# Patient Record
Sex: MALE | Race: WHITE | URBAN - METROPOLITAN AREA
[De-identification: names, ages, dates, MRNs, and addresses within clinical notes are randomized per-mention and may not be internally consistent; named-entity substitution may affect disease eponyms.]

---

## 2024-04-12 ENCOUNTER — TELEPHONE (OUTPATIENT)
Dept: INTERNAL MEDICINE | Age: 64
End: 2024-04-12

## 2024-04-21 NOTE — PROGRESS NOTES
[facility] - [facility] Internal Medicine      SUBJECTIVE:  Kamran Dutta (:  1960) is a 63 y.o. male here for evaluation of the following chief complaint(s):  Establish Care, Memory (Having memory issues, seen neurologist Dr. Veloz in [place], gets lost driving, carries notebook, ladder fell on head (TBI), [id number]), and severe nasal drainage  Complains of excessive drainage especially at night.  After eating he feels that the drainage and foot  gets stuck. Lots of coughing in the morning. Th ecough can be so severe that he nearly faints.  Uses Afrin every night. These symptoms started becoming worse 2 years ago. Currently on social security.  + deviated septum as well.    Start fluticasone and Claritin.    Check CT scan of sinuses       Elevated BP - has a history of HTN and was on medication.  Last time he took meds was in .  No chest josefa/pressure.  No dizziness or lightheadedness. No palpiatations.  No LE edema.  No Orthopnea or PND. No BULL.    HCTz, 25 mg once daily.   Amlodipine, 5 mg daily.    Check CMP, CBC, TSH, lipid panel.     + occasional migraines.   + neck pain - had LP in the past. First and last vial had blood. 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    Will need to review these old records.     Cable  for [employer] - ladder malfunctioned and hit him on the head.()  For months, when he looked up, he experienced numbness in his R arm. Also had Lumbar spine injury.  Saw provider in [place]. Went to a workman's comp provider but symptoms resolved after 6-8 months.  Occasional back pain.  Toes feel numb.    ? Related to spine disease. Will discuss further at next visit.     Tobacco use - has tried to quit.    Interested in patches. Nicoderm patches prescribed.     Heart murmur as a teenager - was not able to play baseball. No murmur appreciated on exam today.     States memory is very poor.  Unable to pinpoint

## 2024-04-22 ENCOUNTER — OFFICE VISIT (OUTPATIENT)
Dept: INTERNAL MEDICINE | Age: 64
End: 2024-04-22
Payer: MEDICAID

## 2024-04-22 VITALS
SYSTOLIC BLOOD PRESSURE: 178 MMHG | HEIGHT: 69 IN | OXYGEN SATURATION: 93 % | TEMPERATURE: 97 F | RESPIRATION RATE: 18 BRPM | BODY MASS INDEX: 23.7 KG/M2 | HEART RATE: 90 BPM | DIASTOLIC BLOOD PRESSURE: 100 MMHG | WEIGHT: 160 LBS

## 2024-04-22 DIAGNOSIS — Z13.9 ENCOUNTER FOR SCREENING INVOLVING SOCIAL DETERMINANTS OF HEALTH (SDOH): ICD-10-CM

## 2024-04-22 DIAGNOSIS — Z11.59 NEED FOR HEPATITIS C SCREENING TEST: ICD-10-CM

## 2024-04-22 DIAGNOSIS — J34.2 DEVIATED SEPTUM: ICD-10-CM

## 2024-04-22 DIAGNOSIS — Z11.4 SCREENING FOR HIV (HUMAN IMMUNODEFICIENCY VIRUS): ICD-10-CM

## 2024-04-22 DIAGNOSIS — Z12.5 SCREENING FOR PROSTATE CANCER: ICD-10-CM

## 2024-04-22 DIAGNOSIS — R41.3 MEMORY LOSS: Primary | ICD-10-CM

## 2024-04-22 DIAGNOSIS — I10 PRIMARY HYPERTENSION: ICD-10-CM

## 2024-04-22 DIAGNOSIS — R05.3 CHRONIC COUGH: ICD-10-CM

## 2024-04-22 DIAGNOSIS — J34.89 SINUS DRAINAGE: ICD-10-CM

## 2024-04-22 DIAGNOSIS — R41.3 MEMORY LOSS: ICD-10-CM

## 2024-04-22 LAB
ALBUMIN: 4.5 G/DL (ref 3.5–5.2)
ALP BLD-CCNC: 95 U/L (ref 40–129)
ALT SERPL-CCNC: 16 U/L (ref 0–40)
ANION GAP SERPL CALCULATED.3IONS-SCNC: 16 MMOL/L (ref 7–16)
AST SERPL-CCNC: 19 U/L (ref 0–39)
BASOPHILS ABSOLUTE: 0.07 K/UL (ref 0–0.2)
BASOPHILS RELATIVE PERCENT: 1 % (ref 0–2)
BILIRUB SERPL-MCNC: 0.3 MG/DL (ref 0–1.2)
BUN BLDV-MCNC: 14 MG/DL (ref 6–23)
CALCIUM SERPL-MCNC: 9.6 MG/DL (ref 8.6–10.2)
CHLORIDE BLD-SCNC: 102 MMOL/L (ref 98–107)
CHOLESTEROL: 305 MG/DL
CO2: 22 MMOL/L (ref 22–29)
CREAT SERPL-MCNC: 0.9 MG/DL (ref 0.7–1.2)
EOSINOPHILS ABSOLUTE: 0.23 K/UL (ref 0.05–0.5)
EOSINOPHILS RELATIVE PERCENT: 3 % (ref 0–6)
FOLATE: 16.5 NG/ML (ref 4.8–24.2)
GFR SERPL CREATININE-BSD FRML MDRD: >90 ML/MIN/1.73M2
GLUCOSE BLD-MCNC: 77 MG/DL (ref 74–99)
HCT VFR BLD CALC: 50.6 % (ref 37–54)
HDLC SERPL-MCNC: 58 MG/DL
HEMOGLOBIN: 16.7 G/DL (ref 12.5–16.5)
IMMATURE GRANULOCYTES %: 0 % (ref 0–5)
IMMATURE GRANULOCYTES ABSOLUTE: <0.03 K/UL (ref 0–0.58)
LDL CHOLESTEROL: 214 MG/DL
LYMPHOCYTES ABSOLUTE: 1.99 K/UL (ref 1.5–4)
LYMPHOCYTES RELATIVE PERCENT: 26 % (ref 20–42)
MCH RBC QN AUTO: 31.5 PG (ref 26–35)
MCHC RBC AUTO-ENTMCNC: 33 G/DL (ref 32–34.5)
MCV RBC AUTO: 95.5 FL (ref 80–99.9)
MONOCYTES ABSOLUTE: 0.83 K/UL (ref 0.1–0.95)
MONOCYTES RELATIVE PERCENT: 11 % (ref 2–12)
NEUTROPHILS ABSOLUTE: 4.62 K/UL (ref 1.8–7.3)
NEUTROPHILS RELATIVE PERCENT: 60 % (ref 43–80)
PDW BLD-RTO: 13.8 % (ref 11.5–15)
PLATELET # BLD: 258 K/UL (ref 130–450)
PMV BLD AUTO: 10.5 FL (ref 7–12)
POTASSIUM SERPL-SCNC: 4.4 MMOL/L (ref 3.5–5)
PROSTATE SPECIFIC ANTIGEN: 1.89 NG/ML (ref 0–4)
RBC # BLD: 5.3 M/UL (ref 3.8–5.8)
SODIUM BLD-SCNC: 140 MMOL/L (ref 132–146)
TOTAL PROTEIN: 7.6 G/DL (ref 6.4–8.3)
TRIGL SERPL-MCNC: 163 MG/DL
TSH SERPL DL<=0.05 MIU/L-ACNC: 1.84 UIU/ML (ref 0.27–4.2)
VITAMIN B-12: 419 PG/ML (ref 211–946)
VLDLC SERPL CALC-MCNC: 33 MG/DL
WBC # BLD: 7.8 K/UL (ref 4.5–11.5)

## 2024-04-22 PROCEDURE — 99204 OFFICE O/P NEW MOD 45 MIN: CPT | Performed by: INTERNAL MEDICINE

## 2024-04-22 PROCEDURE — 3080F DIAST BP >= 90 MM HG: CPT | Performed by: INTERNAL MEDICINE

## 2024-04-22 PROCEDURE — 3077F SYST BP >= 140 MM HG: CPT | Performed by: INTERNAL MEDICINE

## 2024-04-22 PROCEDURE — 36415 COLL VENOUS BLD VENIPUNCTURE: CPT | Performed by: INTERNAL MEDICINE

## 2024-04-22 RX ORDER — NICOTINE 21 MG/24HR
1 PATCH, TRANSDERMAL 24 HOURS TRANSDERMAL DAILY
Qty: 42 PATCH | Refills: 0 | Status: SHIPPED | OUTPATIENT
Start: 2024-04-22 | End: 2024-06-03

## 2024-04-22 RX ORDER — HYDROCHLOROTHIAZIDE 25 MG/1
25 TABLET ORAL EVERY MORNING
Qty: 90 TABLET | Refills: 1 | Status: SHIPPED | OUTPATIENT
Start: 2024-04-22

## 2024-04-22 RX ORDER — LORATADINE 10 MG/1
10 TABLET ORAL DAILY
Qty: 30 TABLET | Refills: 0 | Status: SHIPPED | OUTPATIENT
Start: 2024-04-22 | End: 2024-05-22

## 2024-04-22 RX ORDER — FLUTICASONE PROPIONATE 50 MCG
1 SPRAY, SUSPENSION (ML) NASAL DAILY
Qty: 16 G | Refills: 2 | Status: SHIPPED | OUTPATIENT
Start: 2024-04-22

## 2024-04-22 RX ORDER — AMLODIPINE BESYLATE 5 MG/1
5 TABLET ORAL DAILY
Qty: 90 TABLET | Refills: 1 | Status: SHIPPED | OUTPATIENT
Start: 2024-04-22

## 2024-04-22 SDOH — ECONOMIC STABILITY: INCOME INSECURITY: HOW HARD IS IT FOR YOU TO PAY FOR THE VERY BASICS LIKE FOOD, HOUSING, MEDICAL CARE, AND HEATING?: SOMEWHAT HARD

## 2024-04-22 SDOH — ECONOMIC STABILITY: FOOD INSECURITY: WITHIN THE PAST 12 MONTHS, THE FOOD YOU BOUGHT JUST DIDN'T LAST AND YOU DIDN'T HAVE MONEY TO GET MORE.: NEVER TRUE

## 2024-04-22 SDOH — ECONOMIC STABILITY: FOOD INSECURITY: WITHIN THE PAST 12 MONTHS, YOU WORRIED THAT YOUR FOOD WOULD RUN OUT BEFORE YOU GOT MONEY TO BUY MORE.: NEVER TRUE

## 2024-04-22 SDOH — ECONOMIC STABILITY: HOUSING INSECURITY
IN THE LAST 12 MONTHS, WAS THERE A TIME WHEN YOU DID NOT HAVE A STEADY PLACE TO SLEEP OR SLEPT IN A SHELTER (INCLUDING NOW)?: NO

## 2024-04-22 ASSESSMENT — PATIENT HEALTH QUESTIONNAIRE - PHQ9
SUM OF ALL RESPONSES TO PHQ9 QUESTIONS 1 & 2: 0
1. LITTLE INTEREST OR PLEASURE IN DOING THINGS: NOT AT ALL
SUM OF ALL RESPONSES TO PHQ QUESTIONS 1-9: 0
SUM OF ALL RESPONSES TO PHQ QUESTIONS 1-9: 0
2. FEELING DOWN, DEPRESSED OR HOPELESS: NOT AT ALL
SUM OF ALL RESPONSES TO PHQ QUESTIONS 1-9: 0
SUM OF ALL RESPONSES TO PHQ QUESTIONS 1-9: 0

## 2024-04-22 ASSESSMENT — ENCOUNTER SYMPTOMS
COUGH: 1
CHOKING: 1
EYE REDNESS: 0
EYE PAIN: 0
SORE THROAT: 0
SINUS PRESSURE: 0
RHINORRHEA: 1
SHORTNESS OF BREATH: 0
CHEST TIGHTNESS: 0
TROUBLE SWALLOWING: 1
WHEEZING: 0

## 2024-04-22 ASSESSMENT — LIFESTYLE VARIABLES
HOW OFTEN DO YOU HAVE A DRINK CONTAINING ALCOHOL: MONTHLY OR LESS
HOW MANY STANDARD DRINKS CONTAINING ALCOHOL DO YOU HAVE ON A TYPICAL DAY: 3 OR 4

## 2024-04-22 NOTE — PROGRESS NOTES
Six tube of blood (3 gold, two green and 1 lavender) drawn from [facility] and sent to lab via tube system at 1444. See media. Leta Coello, NAMITAN

## 2024-04-22 NOTE — PATIENT INSTRUCTIONS
Your CT scan of your sinuses has been scheduled for Wednesday 4/24/2024, at 0930 at [facility]. Park in the emergency room parking lot. Enter under canopy B. Turn left down the hallway. When you reach the next hallway, turn right and then immediately right again into the radiology reception area. Bring your photo ID and insurance card. NOTHING TO EAT OR DRINK 3 HOURS PRIOR TO THIS TEST (NOTHING AFTER 6:00 AM)    You can take your X-ray slip with you and have the x-ray completed after your CT scan. Please give it to the reception desk person and they will help you.    Your follow up with Dr. Martin is Monday May 6th at 1:00 pm          [employer] Resources*  (Call 211 if need more resources.)     HELP NETWORK OF [facility]:  What they do: Provides 24-hr, 7 days a week access to information on community resources for financial help. [facility] AND [facility]  Phone: 211 or [phone]    [facility] FAMILY SERVICE: [address], [place] [address]  What they offer: Limited assistance to restore/ prevent utility disconnection.  Phone Number: [phone]  Website: [employer]    DEPARTMENT OF JOB AND FAMILY SERVICES:  MAIN [facility] LINE FOR ALL [facility]: [phone]  What they do: [place] works first with temporary cash assistance if there are children in household.   [facility] DJFS: [address] [place] [address]  Phone: [phone], [phone]  [facility] DJFS: [address] [place] [address]  Phone: [phone]  [facility] DJFS: [address] [place] [address]  Phone: [phone]  Website: [email or web address]    [employer] Financial Assistance  What they offer: Assistance with [employer] bills  Phone: [phone], option #5     Medications:  [facility]  What they offer: [facility] [employer] tracks prescription drug prices and provides free drug coupons for discounts on medications.  Website:

## 2024-04-23 ENCOUNTER — TELEPHONE (OUTPATIENT)
Dept: INTERNAL MEDICINE | Age: 64
End: 2024-04-23

## 2024-04-23 LAB
HEPATITIS C ANTIBODY: NONREACTIVE
HIV AG/AB: NONREACTIVE

## 2024-04-23 NOTE — TELEPHONE ENCOUNTER
Consult from Dr LUCY Martin regarding pt's concerns related to financial issues, especially medical.  Phone call to to pt and message left to call LSW direct number

## 2024-04-24 ENCOUNTER — HOSPITAL ENCOUNTER (OUTPATIENT)
Dept: CT IMAGING | Age: 64
Discharge: HOME | End: 2024-04-26
Attending: INTERNAL MEDICINE
Payer: MEDICAID

## 2024-04-24 DIAGNOSIS — J34.2 DEVIATED SEPTUM: ICD-10-CM

## 2024-04-24 DIAGNOSIS — J34.89 SINUS DRAINAGE: ICD-10-CM

## 2024-04-24 PROCEDURE — 70488 CT MAXILLOFACIAL W/O & W/DYE: CPT

## 2024-04-24 PROCEDURE — 6360000004 HC RX CONTRAST MEDICATION: Performed by: RADIOLOGY

## 2024-04-24 RX ADMIN — IOPAMIDOL 75 ML: 755 INJECTION, SOLUTION INTRAVENOUS at 09:20

## 2024-04-26 ENCOUNTER — TELEPHONE (OUTPATIENT)
Dept: INTERNAL MEDICINE | Age: 64
End: 2024-04-26

## 2024-04-26 DIAGNOSIS — J34.89 RHINORRHEA: Primary | ICD-10-CM

## 2024-04-26 PROBLEM — I10 PRIMARY HYPERTENSION: Status: ACTIVE | Noted: 2024-04-26

## 2024-04-26 PROBLEM — R41.3 MEMORY LOSS: Status: ACTIVE | Noted: 2024-04-26

## 2024-04-26 PROBLEM — R05.3 CHRONIC COUGH: Status: ACTIVE | Noted: 2024-04-26

## 2024-04-26 PROBLEM — J34.2 DEVIATED SEPTUM: Status: ACTIVE | Noted: 2024-04-26

## 2024-04-26 NOTE — TELEPHONE ENCOUNTER
at office visit on 4/22/2024. He states that this is troublesome for him. He is agreeable to an ENT referral and I have placed this referral.     Emani Martin MD  4/26/2024

## 2024-04-29 ENCOUNTER — TELEPHONE (OUTPATIENT)
Dept: INTERNAL MEDICINE | Age: 64
End: 2024-04-29

## 2024-04-29 NOTE — TELEPHONE ENCOUNTER
Second call to pt re: financial concerns.  Pt was open and engaged with conversation.   Pt recently began receiving early social security intermediate of $1560 per month as well as [employer] Medicaid. Pt satisfied with coverage of medical bills and prescriptions thru [employer]; provided education of verifying with outside [facility] physicians he may have for specialty care to verify as being in network for insurance.  Pt reports biggest concern is in relation to healthy foods as he concentrating on nutritional intake due to cholesterol labs and girlfriend's advice and follow up,  Pt reports receiving $25 in SNAP benefits and in process of providing additional documentation to hopefully increase.  LSW discussed [employer] program and [employer] (info mailed)  as well as completed [employer] text pantry list on phone.  Pt appreciative and advised to call LSW direct number as needed.  PT denied any further concerns.

## 2024-05-07 NOTE — PROGRESS NOTES
Care gaps identified:   Low dose CT lung screen  Colorectal Cancer Screening  PCV-20  TDaP  Shingrix  RSV  Covid  Leta Coello, NAMITAN

## 2024-05-08 NOTE — PROGRESS NOTES
[facility] Physicians - [facility] Internal Medicine      SUBJECTIVE:  Kamran Dutta (:  1960) is a 63 y.o. male here for evaluation of the following chief complaint(s):  Hypertension and Memory Loss  Increased Hb - likely secondary to tobacco use. Nicoderm patches were prescribed.  Reports that he continues to work on smoking cessation.     Increased ASCVD risk - discussed with results the option of a statin.  He wanted to hold on his medication therapy and try lifestyle modification. Gave up potato chips which he reports that he was eating a lot of each day.    Need to recheck lipids in 3 months and if still elevated, will start statin.     HTN - check ambulatory BPs.   Currently, on HCTZ, 25 mg once daily and amlodipine, 5 mg daily.    Continue same and follow BPs.    Nasal drainage - Referral was placed to ENT. ?CSF? S/P trauma. CT scan unremarkable. Loratadine and fluticasone were prescribed at last office visit on 2024. Has noticed that this has improved.  Decreased cough in the morning and decreased drainage after eating.    Will forward this encounter to ENT.     Memory concerns - MMSE today with 27/20. Reports that he was given accommodations at work where he would not be given a written test but was given a practical test. Memory worsened after ladder accident. Had headache for month after the accident and memory worsened,    Check MRI of brain to rule out pathology.  Will also have MRA as patient with history of aneurysm.    Will discuss with speech therapy if cognitive therapy is available to assist Kamran with his short term memory.      B crackles - CXR was ordered at last visit - not yet completed.  CT lung cancer screening ordered.  This is not yet scheduled.    CXR prescription given to Kamran to obtain.   Await CT lung cancer screening.     Tobacco abuse - ongoing.     Check CT scan of lung for cancer screening.     Review of Systems - as above.     Current Outpatient

## 2024-05-09 ENCOUNTER — OFFICE VISIT (OUTPATIENT)
Dept: INTERNAL MEDICINE | Age: 64
End: 2024-05-09
Payer: MEDICAID

## 2024-05-09 VITALS
RESPIRATION RATE: 18 BRPM | BODY MASS INDEX: 23.55 KG/M2 | HEIGHT: 69 IN | TEMPERATURE: 97.4 F | SYSTOLIC BLOOD PRESSURE: 134 MMHG | OXYGEN SATURATION: 98 % | DIASTOLIC BLOOD PRESSURE: 87 MMHG | WEIGHT: 159 LBS | HEART RATE: 81 BPM

## 2024-05-09 DIAGNOSIS — Z12.11 SCREENING FOR COLON CANCER: ICD-10-CM

## 2024-05-09 DIAGNOSIS — R41.3 MEMORY LOSS, SHORT TERM: ICD-10-CM

## 2024-05-09 DIAGNOSIS — Z86.79 HISTORY OF ANEURYSM: ICD-10-CM

## 2024-05-09 DIAGNOSIS — Z12.2 ENCOUNTER FOR SCREENING FOR LUNG CANCER: Primary | ICD-10-CM

## 2024-05-09 PROCEDURE — 99214 OFFICE O/P EST MOD 30 MIN: CPT | Performed by: INTERNAL MEDICINE

## 2024-05-09 PROCEDURE — 3075F SYST BP GE 130 - 139MM HG: CPT | Performed by: INTERNAL MEDICINE

## 2024-05-09 PROCEDURE — 3079F DIAST BP 80-89 MM HG: CPT | Performed by: INTERNAL MEDICINE

## 2024-05-09 RX ORDER — LORATADINE 10 MG/1
10 TABLET ORAL DAILY
Qty: 90 TABLET | Refills: 1 | Status: SHIPPED | OUTPATIENT
Start: 2024-05-09 | End: 2024-11-05

## 2024-05-09 NOTE — PATIENT INSTRUCTIONS
We have scheduled your tests on Friday, May 24, 2024. Please arrive at [facility]. You must arrive at radiology reception at 2:15 pm. Park in the emergency room parking lot. Enter through canopy B. When you enter, turn left down the hallway and proceed down to the next hallway. Turn right and then immediately right into radiology reception. Your tests will proceed as follows:    2:30 pm - CT Lung screening  3:00 pm - MRA of the Head with and without contrast  4:00 pm - MRI of the Brain with and without contrast    The radiology staff will help direct you to each test.  You may also complete your Chest X-ray that day if you want. Just give the radiology department your order and you can have that test done at the same time. It is a very quick test and can save you a trip to the hospital.     DO NOT WEAR ANY METAL TO YOUR TEST THAT DAY, INCLUDING JEWELRY, BUTTONS, ZIPPERS OR METALLIC FABRICS    We have also attached information on the pneumonia vaccine to this after visit summary. Please review and consider. We will talk about that at your next visit.

## 2024-05-24 ENCOUNTER — HOSPITAL ENCOUNTER (OUTPATIENT)
Dept: CT IMAGING | Age: 64
End: 2024-05-24
Attending: INTERNAL MEDICINE
Payer: MEDICAID

## 2024-05-24 ENCOUNTER — HOSPITAL ENCOUNTER (OUTPATIENT)
Dept: MRI IMAGING | Age: 64
End: 2024-05-24
Attending: INTERNAL MEDICINE
Payer: MEDICAID

## 2024-05-24 DIAGNOSIS — Z86.79 HISTORY OF ANEURYSM: ICD-10-CM

## 2024-05-24 DIAGNOSIS — R41.3 MEMORY LOSS, SHORT TERM: ICD-10-CM

## 2024-05-24 DIAGNOSIS — Z12.2 ENCOUNTER FOR SCREENING FOR LUNG CANCER: ICD-10-CM

## 2024-05-24 PROCEDURE — A9577 INJ MULTIHANCE: HCPCS | Performed by: RADIOLOGY

## 2024-05-24 PROCEDURE — 70544 MR ANGIOGRAPHY HEAD W/O DYE: CPT

## 2024-05-24 PROCEDURE — 71271 CT THORAX LUNG CANCER SCR C-: CPT

## 2024-05-24 PROCEDURE — 70553 MRI BRAIN STEM W/O & W/DYE: CPT

## 2024-05-24 PROCEDURE — 6360000004 HC RX CONTRAST MEDICATION: Performed by: RADIOLOGY

## 2024-05-24 RX ADMIN — GADOBENATE DIMEGLUMINE 15 ML: 529 INJECTION, SOLUTION INTRAVENOUS at 15:26

## 2024-05-29 ENCOUNTER — TELEPHONE (OUTPATIENT)
Dept: INTERNAL MEDICINE | Age: 64
End: 2024-05-29

## 2024-05-29 NOTE — TELEPHONE ENCOUNTER
Please let Kamran know that the MRI and MRA of his brain are normal.  All of the blood vessels in his brain look normal.  Also, his Lung Cancer screening CT scan was normal without any nodules seen.  He will need a repeat lung scan in 1 year.     Emani Martin MD  5/29/2024

## 2024-06-19 NOTE — PROGRESS NOTES
[facility] Physicians - [facility] Internal Medicine      SUBJECTIVE:  Kamrna Dutta (:  1960) is a 63 y.o. male here for evaluation of the following chief complaint(s):  Follow-up and Hypertension  Increased Hb - patches prescribed previously.  Still has these patches. Was working on decreasing cigarette usage.  Down to 4 cigarettes daily.   Continued to encourage complete tobacco cessation.      Increased ASCVD risk - needs a recheck of lipids at the end of July to further assess cholesterol and ongoing need for a statin.    Lipid panel ordered to be completed next month.     HTN - On HCTZ, 25 mg once daily and amlodipine, 5 mg daily. Controlled.    Continue current medications.      Nasal drainage - improved on allergy treatment.  Has ENT appointment on 2024.  Currently on loratadine and fluticasone. This is mainly in the morning.  Feels like food gets stuck from drainage.    Add singulair to current treatment.     Memory concerns - Normal MMSE score last visit of  and normal MRI and MRA.    Referral for cognitive therapy.     Tobacco abuse - ongoing. Smoking just 4 cigarettes a day.  Morning cough improving.  Lung CT was normal on 2024.    Repeat lung cancer screening in 1 year.     Crackles on lung exam - normal imaging.     Reports fatigue - going to bed earlier.  Tasks are a bit harder.  Has to stop due to breathing and also just overall tiredness. Denies depression at this time. Uncertain etiology.  Denies depression.  Recent labs are normal.   Continue to monitor.  Will need additional evaluation should symptoms continue.      Sent FIT test back to us - not yet resulted.     Review of Systems - as above.     Current Outpatient Medications on File Prior to Visit   Medication Sig Dispense Refill    loratadine (CLARITIN) 10 MG tablet Take 1 tablet by mouth daily 90 tablet 1    fluticasone (FLONASE) 50 MCG/ACT nasal spray 1 spray by Each Nostril route daily 16 g 2

## 2024-06-20 ENCOUNTER — OFFICE VISIT (OUTPATIENT)
Dept: INTERNAL MEDICINE | Age: 64
End: 2024-06-20
Payer: MEDICAID

## 2024-06-20 VITALS
BODY MASS INDEX: 22.66 KG/M2 | DIASTOLIC BLOOD PRESSURE: 76 MMHG | HEIGHT: 69 IN | TEMPERATURE: 97.1 F | WEIGHT: 153 LBS | OXYGEN SATURATION: 94 % | RESPIRATION RATE: 18 BRPM | SYSTOLIC BLOOD PRESSURE: 115 MMHG | HEART RATE: 88 BPM

## 2024-06-20 DIAGNOSIS — E78.00 ELEVATED CHOLESTEROL: Primary | ICD-10-CM

## 2024-06-20 PROCEDURE — 99214 OFFICE O/P EST MOD 30 MIN: CPT | Performed by: INTERNAL MEDICINE

## 2024-06-20 PROCEDURE — 3074F SYST BP LT 130 MM HG: CPT | Performed by: INTERNAL MEDICINE

## 2024-06-20 PROCEDURE — 3078F DIAST BP <80 MM HG: CPT | Performed by: INTERNAL MEDICINE

## 2024-06-20 PROCEDURE — 99213 OFFICE O/P EST LOW 20 MIN: CPT | Performed by: INTERNAL MEDICINE

## 2024-06-20 RX ORDER — MONTELUKAST SODIUM 10 MG/1
10 TABLET ORAL DAILY
Qty: 90 TABLET | Refills: 1 | Status: SHIPPED | OUTPATIENT
Start: 2024-06-20

## 2024-06-20 RX ORDER — ZOSTER VACCINE RECOMBINANT, ADJUVANTED 50 MCG/0.5
0.5 KIT INTRAMUSCULAR SEE ADMIN INSTRUCTIONS
Qty: 0.5 ML | Refills: 0 | Status: SHIPPED | OUTPATIENT
Start: 2024-06-20 | End: 2024-12-17

## 2024-08-08 RX ORDER — AMLODIPINE BESYLATE 5 MG/1
5 TABLET ORAL DAILY
Qty: 90 TABLET | Refills: 1 | Status: SHIPPED | OUTPATIENT
Start: 2024-08-08

## 2024-08-08 RX ORDER — HYDROCHLOROTHIAZIDE 25 MG/1
25 TABLET ORAL EVERY MORNING
Qty: 90 TABLET | Refills: 1 | Status: SHIPPED | OUTPATIENT
Start: 2024-08-08

## 2024-08-08 NOTE — TELEPHONE ENCOUNTER
Last Appointment:  6/20/2024  Future Appointments   Date Time Provider Department Center   9/25/2024  8:30 AM Emani Martin MD [facility] IM [facility] DEP

## 2024-10-09 ENCOUNTER — OFFICE VISIT (OUTPATIENT)
Dept: INTERNAL MEDICINE | Age: 64
End: 2024-10-09
Payer: MEDICAID

## 2024-10-09 VITALS
RESPIRATION RATE: 16 BRPM | HEART RATE: 74 BPM | SYSTOLIC BLOOD PRESSURE: 133 MMHG | BODY MASS INDEX: 23.13 KG/M2 | OXYGEN SATURATION: 96 % | TEMPERATURE: 97.9 F | DIASTOLIC BLOOD PRESSURE: 81 MMHG | WEIGHT: 156.2 LBS | HEIGHT: 69 IN

## 2024-10-09 DIAGNOSIS — I10 PRIMARY HYPERTENSION: ICD-10-CM

## 2024-10-09 DIAGNOSIS — R20.2 PARESTHESIA OF BOTH FEET: ICD-10-CM

## 2024-10-09 DIAGNOSIS — E78.5 HYPERLIPIDEMIA, UNSPECIFIED HYPERLIPIDEMIA TYPE: ICD-10-CM

## 2024-10-09 DIAGNOSIS — D75.1 POLYCYTHEMIA: ICD-10-CM

## 2024-10-09 DIAGNOSIS — R05.3 CHRONIC COUGH: ICD-10-CM

## 2024-10-09 DIAGNOSIS — E78.00 ELEVATED CHOLESTEROL: Primary | ICD-10-CM

## 2024-10-09 LAB
ANION GAP SERPL CALCULATED.3IONS-SCNC: 14 MMOL/L (ref 7–16)
BASOPHILS ABSOLUTE: 0.07 K/UL (ref 0–0.2)
BASOPHILS RELATIVE PERCENT: 1 % (ref 0–2)
BUN BLDV-MCNC: 19 MG/DL (ref 6–23)
CALCIUM SERPL-MCNC: 10.2 MG/DL (ref 8.6–10.2)
CHLORIDE BLD-SCNC: 102 MMOL/L (ref 98–107)
CHOLESTEROL, TOTAL: 313 MG/DL
CO2: 28 MMOL/L (ref 22–29)
CREAT SERPL-MCNC: 0.8 MG/DL (ref 0.7–1.2)
EOSINOPHILS ABSOLUTE: 0.25 K/UL (ref 0.05–0.5)
EOSINOPHILS RELATIVE PERCENT: 3 % (ref 0–6)
FOLATE: >20 NG/ML (ref 4.8–24.2)
GFR, ESTIMATED: >90 ML/MIN/1.73M2
GLUCOSE BLD-MCNC: 92 MG/DL (ref 74–99)
HCT VFR BLD CALC: 53.7 % (ref 37–54)
HDLC SERPL-MCNC: 63 MG/DL
HEMOGLOBIN: 17.6 G/DL (ref 12.5–16.5)
IMMATURE GRANULOCYTES %: 0 % (ref 0–5)
IMMATURE GRANULOCYTES ABSOLUTE: <0.03 K/UL (ref 0–0.58)
LDL CHOLESTEROL: 207 MG/DL
LYMPHOCYTES ABSOLUTE: 1.98 K/UL (ref 1.5–4)
LYMPHOCYTES RELATIVE PERCENT: 25 % (ref 20–42)
MCH RBC QN AUTO: 31.5 PG (ref 26–35)
MCHC RBC AUTO-ENTMCNC: 32.8 G/DL (ref 32–34.5)
MCV RBC AUTO: 96.2 FL (ref 80–99.9)
MONOCYTES ABSOLUTE: 0.8 K/UL (ref 0.1–0.95)
MONOCYTES RELATIVE PERCENT: 10 % (ref 2–12)
NEUTROPHILS ABSOLUTE: 4.94 K/UL (ref 1.8–7.3)
NEUTROPHILS RELATIVE PERCENT: 61 % (ref 43–80)
PDW BLD-RTO: 14.3 % (ref 11.5–15)
PLATELET # BLD: 285 K/UL (ref 130–450)
PMV BLD AUTO: 10.2 FL (ref 7–12)
POTASSIUM SERPL-SCNC: 4.6 MMOL/L (ref 3.5–5)
RBC # BLD: 5.58 M/UL (ref 3.8–5.8)
SODIUM BLD-SCNC: 144 MMOL/L (ref 132–146)
TRIGL SERPL-MCNC: 215 MG/DL
VITAMIN B-12: 571 PG/ML (ref 211–946)
VLDLC SERPL CALC-MCNC: 43 MG/DL
WBC # BLD: 8.1 K/UL (ref 4.5–11.5)

## 2024-10-09 PROCEDURE — 3075F SYST BP GE 130 - 139MM HG: CPT | Performed by: INTERNAL MEDICINE

## 2024-10-09 PROCEDURE — 99214 OFFICE O/P EST MOD 30 MIN: CPT | Performed by: INTERNAL MEDICINE

## 2024-10-09 PROCEDURE — 3079F DIAST BP 80-89 MM HG: CPT | Performed by: INTERNAL MEDICINE

## 2024-10-09 PROCEDURE — 36415 COLL VENOUS BLD VENIPUNCTURE: CPT | Performed by: INTERNAL MEDICINE

## 2024-10-09 PROCEDURE — 99213 OFFICE O/P EST LOW 20 MIN: CPT | Performed by: INTERNAL MEDICINE

## 2024-10-09 RX ORDER — AMLODIPINE BESYLATE 5 MG/1
5 TABLET ORAL DAILY
Qty: 90 TABLET | Refills: 1 | Status: SHIPPED | OUTPATIENT
Start: 2024-10-09

## 2024-10-09 RX ORDER — HYDROCHLOROTHIAZIDE 25 MG/1
25 TABLET ORAL EVERY MORNING
Qty: 90 TABLET | Refills: 1 | Status: SHIPPED | OUTPATIENT
Start: 2024-10-09

## 2024-10-09 RX ORDER — ZOSTER VACCINE RECOMBINANT, ADJUVANTED 50 MCG/0.5
0.5 KIT INTRAMUSCULAR SEE ADMIN INSTRUCTIONS
Qty: 0.5 ML | Refills: 0 | Status: SHIPPED | OUTPATIENT
Start: 2024-10-09 | End: 2025-04-07

## 2024-10-09 RX ORDER — MONTELUKAST SODIUM 10 MG/1
10 TABLET ORAL DAILY
Qty: 90 TABLET | Refills: 1 | Status: SHIPPED | OUTPATIENT
Start: 2024-10-09

## 2024-10-09 RX ORDER — LORATADINE 10 MG/1
10 TABLET ORAL DAILY
Qty: 90 TABLET | Refills: 1 | Status: SHIPPED | OUTPATIENT
Start: 2024-10-09 | End: 2025-04-07

## 2024-10-09 NOTE — PROGRESS NOTES
[facility] - [facility] Internal Medicine      SUBJECTIVE:  Kamran Dutta (:  1960) is a 64 y.o. male here for evaluation of the following chief complaint(s):  Hypertension, Medication Refill, and Cough (Medications not helping)  Increased Hb - check epopoeitin level. Likely secondary to cigarette smoking.    Recheck CBC   Check epo level.    Check peripheral smear.      Increased ASCVD risk - repeat lipid panel was ordered on .  Will need to have this drawn today.    Check fasting lipid panel.       HTN - On HCTZ, 25 mg once daily and amlodipine, 5 mg daily. Very well controlled.    Check BMP                     Nasal drainage - improved on allergy treatment.  ENT appointment .  Currently on loratadine and fluticasone and singulair which was new at last visit.    Will help Anthony set up this appointment with ENT in [place].   .   Memory concerns - Normal MMSE score last visit of  and normal MRI and MRA.   Refer for cognitive therapy - Anthony would like to hold off on this.       Tobacco abuse - ongoing. Smoking just 4 cigarettes a day. Lung CT was normal on 2024. This may be contributing. Still has nicotine patches. Cougs in the morning - sputum is clear and white.               Repeat lung cancer screening in 1 year.         Crackles on lung exam - normal imaging. Ongoing cough - had a coughing spell last week that caused him to have a presyncopal episode.    Check PFTs    Reports fatigue - this is improving.     Need FIT test to be completed - Anthony states that he completed and sent back.  Not resulted in the chart.    Will check to see if this was received.     Review of Systems as above     Current Outpatient Medications on File Prior to Visit   Medication Sig Dispense Refill    fluticasone (FLONASE) 50 MCG/ACT nasal spray 1 spray by Each Nostril route daily (Patient not taking: Reported on 10/9/2024) 16 g 2    nicotine (NICODERM CQ) 21 MG/24HR Place 1 patch onto the

## 2024-10-09 NOTE — PROGRESS NOTES
Four tube of blood drawn from [facility] using #21Gx1.5\" straight needle at 0945. Inverted 20 times. Tubes as follows:    One gold, protected from light  One gold  One green  One lavender    Sent to lab via tube system at 1000  See media.  Leta Coello LPN    11/5/24 Discussed with patient. Will get TDaP at next visit. Leta Coello LPN

## 2024-10-10 LAB — PATHOLOGIST REVIEW: NORMAL

## 2024-10-11 LAB — ERYTHROPOIETIN: 8 MU/ML (ref 4–27)

## 2024-11-07 ENCOUNTER — PATIENT MESSAGE (OUTPATIENT)
Dept: INTERNAL MEDICINE | Age: 64
End: 2024-11-07

## 2024-11-07 DIAGNOSIS — J34.89 RHINORRHEA: Primary | ICD-10-CM

## 2024-11-07 DIAGNOSIS — J34.89 SINUS DRAINAGE: ICD-10-CM

## 2024-11-12 NOTE — TELEPHONE ENCOUNTER
Referral reordered.  This was previously placed earlier this year. Will send to ENT.     Emani Martin MD  11/12/2024

## 2024-11-15 ENCOUNTER — TELEPHONE (OUTPATIENT)
Dept: INTERNAL MEDICINE | Age: 64
End: 2024-11-15

## 2024-11-15 NOTE — TELEPHONE ENCOUNTER
Left voicemail explaining a FIT test for colon cancer screening was given to patient on 5/9/24 and we still have not received it back. Asked patient to please take test and send to us. Leta Coello LPN

## 2024-12-10 NOTE — PROGRESS NOTES
[facility] Physicians - [facility] Internal Medicine      SUBJECTIVE:  Kamran Dutta (:  1960) is a 64 y.o. male here for evaluation of the following chief complaint(s):  Hypertension  Increased Hb - check epopoeitin level. Likely secondary to cigarette smoking. Labs normal.     Increased ASCVD risk - repeat lipid panel was ordered on .  Will need to have this drawn today.   The 10-year ASCVD risk score (Jerome BUNDY, et al., 2019) is: 22.4%    Values used to calculate the score:      Age: 64 years      Sex: Male      Is Non- : No      Diabetic: No      Tobacco smoker: Yes      Systolic Blood Pressure: 126 mmHg      Is BP treated: Yes      HDL Cholesterol: 63 mg/dL      Total Cholesterol: 313 mg/dL  Needs moderate dose statin.         HTN - On HCTZ, 25 mg once daily and amlodipine, 5 mg daily.     Nasal drainage - improved on allergy treatment.  ENT appointment scheduled for  .  Currently on loratadine and fluticasone and singulair which was new at last visit.      .   Memory concerns - Normal MMSE score last visit of  and normal MRI and MRA.   Refer for cognitive therapy - Anthony would like to hold off on this.       Tobacco abuse - ongoing. Smoking just 4 cigarettes a day. Lung CT was normal on 2024. This may be contributing. Still has nicotine patches. Cougs in the morning - sputum is clear and white.               Repeat lung cancer screening in 1 year.      Reports fatigue - this is improving.      Need FIT test to be completed - Anthony states that he completed and sent back.  Not resulted in the chart. Re-order.                Assessment & Plan  1.Hyperlipidemia with LDL > 190  His total cholesterol is 313 mg/dL, HDL is 63 mg/dL, LDL is 207 mg/dL, and triglycerides are 215 mg/dL. Given the elevated LDL and triglycerides, he falls into the category requiring medication. A prescription for Lipitor 40 mg once daily has been issued, with instructions to take

## 2024-12-11 ENCOUNTER — TELEPHONE (OUTPATIENT)
Dept: INTERNAL MEDICINE | Age: 64
End: 2024-12-11

## 2024-12-11 ENCOUNTER — OFFICE VISIT (OUTPATIENT)
Dept: INTERNAL MEDICINE | Age: 64
End: 2024-12-11
Payer: MEDICAID

## 2024-12-11 VITALS
BODY MASS INDEX: 23.55 KG/M2 | HEART RATE: 82 BPM | DIASTOLIC BLOOD PRESSURE: 72 MMHG | TEMPERATURE: 97 F | OXYGEN SATURATION: 100 % | SYSTOLIC BLOOD PRESSURE: 126 MMHG | WEIGHT: 159 LBS | RESPIRATION RATE: 18 BRPM | HEIGHT: 69 IN

## 2024-12-11 DIAGNOSIS — E78.2 MIXED HYPERLIPIDEMIA: ICD-10-CM

## 2024-12-11 DIAGNOSIS — J34.2 DEVIATED SEPTUM: ICD-10-CM

## 2024-12-11 DIAGNOSIS — J34.89 RHINORRHEA: ICD-10-CM

## 2024-12-11 DIAGNOSIS — R41.840 ATTENTION DEFICIT: Primary | ICD-10-CM

## 2024-12-11 DIAGNOSIS — R41.3 MEMORY LOSS: ICD-10-CM

## 2024-12-11 PROCEDURE — 99214 OFFICE O/P EST MOD 30 MIN: CPT | Performed by: INTERNAL MEDICINE

## 2024-12-11 PROCEDURE — 3078F DIAST BP <80 MM HG: CPT | Performed by: INTERNAL MEDICINE

## 2024-12-11 PROCEDURE — 3074F SYST BP LT 130 MM HG: CPT | Performed by: INTERNAL MEDICINE

## 2024-12-11 RX ORDER — AZELASTINE 1 MG/ML
1 SPRAY, METERED NASAL 2 TIMES DAILY
Qty: 60 ML | Refills: 1 | Status: SHIPPED | OUTPATIENT
Start: 2024-12-11

## 2024-12-11 RX ORDER — ATORVASTATIN CALCIUM 40 MG/1
40 TABLET, FILM COATED ORAL DAILY
Qty: 30 TABLET | Refills: 1 | Status: SHIPPED | OUTPATIENT
Start: 2024-12-11

## 2024-12-12 PROBLEM — E78.2 MIXED HYPERLIPIDEMIA: Status: ACTIVE | Noted: 2024-12-12

## 2024-12-12 PROBLEM — R05.3 CHRONIC COUGH: Status: RESOLVED | Noted: 2024-04-26 | Resolved: 2024-12-12

## 2025-01-20 RX ORDER — MONTELUKAST SODIUM 10 MG/1
10 TABLET ORAL DAILY
Qty: 90 TABLET | Refills: 1 | Status: SHIPPED | OUTPATIENT
Start: 2025-01-20

## 2025-01-20 RX ORDER — ATORVASTATIN CALCIUM 40 MG/1
40 TABLET, FILM COATED ORAL DAILY
Qty: 90 TABLET | Refills: 1 | Status: SHIPPED | OUTPATIENT
Start: 2025-01-20

## 2025-02-04 NOTE — PROGRESS NOTES
[facility] Internal Medicine      SUBJECTIVE:  Kamran Dutta (:  1960) is a 64 y.o. male here for evaluation of the following chief complaint(s):  Hyperlipidemia and Hypertension    Assessment & Plan  1.Hyperlipidemia.  His cholesterol levels are well-managed with the current medication regimen. A follow-up appointment is scheduled for 2025 to reassess cholesterol levels and liver function.     2. HTN  His blood pressure readings today are within the normal range. He will continue his current medication regimen. Refills for his blood pressure medication will be sent to [facility] in [place].    3. Nasal drainage.  He reports that his current medications, including Claritin and a nasal spray, are not effective. He is advised to continue his current regimen until his ENT appointment next week. Depending on the ENT's recommendations, adjustments to his medication may be made.    4. Memory issues.  He has performed well on memory assessments, and both MRI and MRA results were normal. Cognitive therapy is recommended to help with memory recall issues. A referral for cognitive therapy will be made after his ENT appointment per patient preference.     5. Smoking cessation.  He is currently smoking 4 cigarettes a day. He is encouraged to quit smoking entirely. Assistance with smoking cessation, such as patches, is offered. He will continue to work on reducing his smoking habit.  Smoking likely accounts for increased Hb concentration on labs.     6. Cold feet.  He reports cold and somewhat numb feet. His pulses are good, indicating adequate blood supply.  An EMG test is discussed but will be considered after his smoking cessation efforts. A diabetes test will be added to his pending blood work for 2025.    7. Tinnitus.  He reports a ringing noise in his ears, especially at night. There is some wax in his ears, but it is not completely blocking the eardrum. He is advised

## 2025-02-05 ENCOUNTER — OFFICE VISIT (OUTPATIENT)
Dept: INTERNAL MEDICINE | Age: 65
End: 2025-02-05
Payer: MEDICAID

## 2025-02-05 ENCOUNTER — SOCIAL WORK (OUTPATIENT)
Dept: INTERNAL MEDICINE | Age: 65
End: 2025-02-05

## 2025-02-05 VITALS
DIASTOLIC BLOOD PRESSURE: 70 MMHG | TEMPERATURE: 97.6 F | BODY MASS INDEX: 24.14 KG/M2 | WEIGHT: 163 LBS | RESPIRATION RATE: 18 BRPM | HEART RATE: 78 BPM | SYSTOLIC BLOOD PRESSURE: 118 MMHG | HEIGHT: 69 IN | OXYGEN SATURATION: 98 %

## 2025-02-05 DIAGNOSIS — I10 PRIMARY HYPERTENSION: Primary | ICD-10-CM

## 2025-02-05 DIAGNOSIS — J31.0 CHRONIC RHINITIS: ICD-10-CM

## 2025-02-05 DIAGNOSIS — E78.2 MIXED HYPERLIPIDEMIA: ICD-10-CM

## 2025-02-05 DIAGNOSIS — Z12.11 COLON CANCER SCREENING: ICD-10-CM

## 2025-02-05 DIAGNOSIS — R20.2 PARESTHESIAS: ICD-10-CM

## 2025-02-05 DIAGNOSIS — E78.00 ELEVATED CHOLESTEROL: ICD-10-CM

## 2025-02-05 DIAGNOSIS — Z59.71 INSURANCE COVERAGE PROBLEMS: ICD-10-CM

## 2025-02-05 PROCEDURE — 99213 OFFICE O/P EST LOW 20 MIN: CPT | Performed by: INTERNAL MEDICINE

## 2025-02-05 PROCEDURE — 3078F DIAST BP <80 MM HG: CPT | Performed by: INTERNAL MEDICINE

## 2025-02-05 PROCEDURE — 99214 OFFICE O/P EST MOD 30 MIN: CPT | Performed by: INTERNAL MEDICINE

## 2025-02-05 PROCEDURE — 3074F SYST BP LT 130 MM HG: CPT | Performed by: INTERNAL MEDICINE

## 2025-02-05 RX ORDER — AMLODIPINE BESYLATE 5 MG/1
5 TABLET ORAL DAILY
Qty: 90 TABLET | Refills: 1 | Status: SHIPPED | OUTPATIENT
Start: 2025-02-05

## 2025-02-05 RX ORDER — ATORVASTATIN CALCIUM 40 MG/1
40 TABLET, FILM COATED ORAL DAILY
Qty: 90 TABLET | Refills: 1 | Status: SHIPPED | OUTPATIENT
Start: 2025-02-05

## 2025-02-05 RX ORDER — FLUTICASONE PROPIONATE 50 MCG
1 SPRAY, SUSPENSION (ML) NASAL DAILY
Qty: 16 G | Refills: 2 | Status: SHIPPED | OUTPATIENT
Start: 2025-02-05

## 2025-02-05 RX ORDER — MONTELUKAST SODIUM 10 MG/1
10 TABLET ORAL DAILY
Qty: 90 TABLET | Refills: 1 | Status: SHIPPED | OUTPATIENT
Start: 2025-02-05

## 2025-02-05 RX ORDER — LORATADINE 10 MG/1
10 TABLET ORAL DAILY
Qty: 90 TABLET | Refills: 1 | Status: SHIPPED | OUTPATIENT
Start: 2025-02-05 | End: 2025-08-04

## 2025-02-05 SDOH — ECONOMIC STABILITY: FOOD INSECURITY: WITHIN THE PAST 12 MONTHS, THE FOOD YOU BOUGHT JUST DIDN'T LAST AND YOU DIDN'T HAVE MONEY TO GET MORE.: NEVER TRUE

## 2025-02-05 SDOH — ECONOMIC STABILITY: FOOD INSECURITY: WITHIN THE PAST 12 MONTHS, YOU WORRIED THAT YOUR FOOD WOULD RUN OUT BEFORE YOU GOT MONEY TO BUY MORE.: NEVER TRUE

## 2025-02-05 ASSESSMENT — PATIENT HEALTH QUESTIONNAIRE - PHQ9
SUM OF ALL RESPONSES TO PHQ QUESTIONS 1-9: 0
SUM OF ALL RESPONSES TO PHQ QUESTIONS 1-9: 0
SUM OF ALL RESPONSES TO PHQ9 QUESTIONS 1 & 2: 0
SUM OF ALL RESPONSES TO PHQ QUESTIONS 1-9: 0
SUM OF ALL RESPONSES TO PHQ QUESTIONS 1-9: 0
2. FEELING DOWN, DEPRESSED OR HOPELESS: NOT AT ALL
1. LITTLE INTEREST OR PLEASURE IN DOING THINGS: NOT AT ALL

## 2025-02-05 NOTE — PROGRESS NOTES
Consult from Dr LUCY Martin during 2.5.25 IM appt related to transition to Medicare.  Met with pt who was unaccompanied to visit and provided own transportation.  Pt reports he will be 64 yo in Sept and is attempting to learn more about Medicare.  Pt endorses being overwhelmed with information received from a local seminar.    LSW provided pt with information re: Medicare Savings Program as pt unsure of present gross social security income.  PT concerned of changes re: out of pocket expenses as presently has [employer] Medicaid and has zero expenses.  Pt will locate social security income information; also advised of [employer] financial assistance which would qualify for for [employer] bills and that application process.  Also provided pt with OSHII[facility] counselors information for unbiased information which he requests.  PLan:   Pt to locate 2025 social security statement/COLA statement and contact LSW

## 2025-02-07 PROBLEM — J31.0 CHRONIC RHINITIS: Status: ACTIVE | Noted: 2025-02-07

## 2025-02-14 ENCOUNTER — OFFICE VISIT (OUTPATIENT)
Dept: ENT CLINIC | Age: 65
End: 2025-02-14
Payer: MEDICAID

## 2025-02-14 VITALS
HEIGHT: 69 IN | BODY MASS INDEX: 24.73 KG/M2 | OXYGEN SATURATION: 98 % | WEIGHT: 167 LBS | SYSTOLIC BLOOD PRESSURE: 124 MMHG | DIASTOLIC BLOOD PRESSURE: 76 MMHG | HEART RATE: 77 BPM

## 2025-02-14 DIAGNOSIS — R09.82 POST-NASAL DRAINAGE: ICD-10-CM

## 2025-02-14 DIAGNOSIS — J34.2 DNS (DEVIATED NASAL SEPTUM): ICD-10-CM

## 2025-02-14 DIAGNOSIS — J30.9 ALLERGIC RHINITIS, UNSPECIFIED SEASONALITY, UNSPECIFIED TRIGGER: Primary | ICD-10-CM

## 2025-02-14 DIAGNOSIS — R09.81 NASAL CONGESTION: ICD-10-CM

## 2025-02-14 PROCEDURE — 3078F DIAST BP <80 MM HG: CPT | Performed by: NURSE PRACTITIONER

## 2025-02-14 PROCEDURE — 3074F SYST BP LT 130 MM HG: CPT | Performed by: NURSE PRACTITIONER

## 2025-02-14 PROCEDURE — 99203 OFFICE O/P NEW LOW 30 MIN: CPT | Performed by: NURSE PRACTITIONER

## 2025-02-14 RX ORDER — ECHINACEA PURPUREA EXTRACT 125 MG
2 TABLET ORAL 4 TIMES DAILY
Qty: 3 EACH | Refills: 3 | Status: SHIPPED | OUTPATIENT
Start: 2025-02-14

## 2025-02-14 RX ORDER — IPRATROPIUM BROMIDE 42 UG/1
2 SPRAY, METERED NASAL 4 TIMES DAILY
Qty: 15 ML | Refills: 3 | Status: SHIPPED | OUTPATIENT
Start: 2025-02-14

## 2025-02-14 ASSESSMENT — ENCOUNTER SYMPTOMS
SINUS PAIN: 0
SINUS PRESSURE: 0
COUGH: 1
SHORTNESS OF BREATH: 0
EYES NEGATIVE: 1
STRIDOR: 0
RHINORRHEA: 0

## 2025-02-14 NOTE — PROGRESS NOTES
DEPARTMENT OF OTOLARYNGOLOGY  RAFFAELE StephensonO., Ms. Ed.  RAFFAELE IglesiasO.  Jorge Alberto Winter, MSN, FNP-C        Patient Name:  Kamran Dutta  :  1960     CHIEF C/O:    Chief Complaint   Patient presents with    New Patient     Chronic sinus drainage,        HISTORY OBTAINED FROM:  patient    HISTORY OF PRESENT ILLNESS:       Kamran is a 64 y.o. year old male, here today for:       Chronic nasal congestion and drainage.  Patient states his symptoms were present for many years.  He does have a history of a previous septoplasty as a teenager but states the septum did read deviate to the right side.  He complains of persistent congestion with frequent drainage especially in the morning time causing him to cough significantly.  He has also been using Afrin nasal spray at nighttime for congestion symptoms.  He has been on Flonase and Astelin in the past and denies any significant improvement.  He also was on Claritin and Singulair but states he does not notice any significant improvement with these medications either.  He did have a CT scan of the sinuses without signs of sinusitis but did show a right nasal septal deviation.  He denies any ear pain or pressure.  He denies any current sinus pain or pressure.  He denies any recent fevers or recent antibiotics.           Past Medical History:   Diagnosis Date    Aneurysm (HCC)     Hypertension     Traumatic brain injury     2014     History reviewed. No pertinent surgical history.    Current Outpatient Medications:     sodium chloride (ALTAMIST SPRAY) 0.65 % nasal spray, 2 sprays by Nasal route 4 times daily, Disp: 3 each, Rfl: 3    ipratropium (ATROVENT) 0.06 % nasal spray, 2 sprays by Each Nostril route 4 times daily, Disp: 15 mL, Rfl: 3    amLODIPine (NORVASC) 5 MG tablet, Take 1 tablet by mouth daily, Disp: 90 tablet, Rfl: 1    atorvastatin (LIPITOR) 40 MG tablet, Take 1 tablet by mouth daily, Disp: 90 tablet, Rfl: 1    fluticasone (FLONASE) 50

## 2025-03-05 ENCOUNTER — OFFICE VISIT (OUTPATIENT)
Dept: INTERNAL MEDICINE | Age: 65
End: 2025-03-05
Payer: MEDICAID

## 2025-03-05 VITALS
HEART RATE: 87 BPM | HEIGHT: 69 IN | DIASTOLIC BLOOD PRESSURE: 70 MMHG | TEMPERATURE: 97 F | RESPIRATION RATE: 18 BRPM | SYSTOLIC BLOOD PRESSURE: 120 MMHG | WEIGHT: 160 LBS | BODY MASS INDEX: 23.7 KG/M2

## 2025-03-05 DIAGNOSIS — I10 PRIMARY HYPERTENSION: ICD-10-CM

## 2025-03-05 DIAGNOSIS — J31.0 CHRONIC RHINITIS: ICD-10-CM

## 2025-03-05 DIAGNOSIS — R55 SYNCOPE, UNSPECIFIED SYNCOPE TYPE: Primary | ICD-10-CM

## 2025-03-05 PROCEDURE — 3074F SYST BP LT 130 MM HG: CPT | Performed by: INTERNAL MEDICINE

## 2025-03-05 PROCEDURE — 3078F DIAST BP <80 MM HG: CPT | Performed by: INTERNAL MEDICINE

## 2025-03-05 PROCEDURE — 93010 ELECTROCARDIOGRAM REPORT: CPT | Performed by: INTERNAL MEDICINE

## 2025-03-05 PROCEDURE — 99214 OFFICE O/P EST MOD 30 MIN: CPT | Performed by: INTERNAL MEDICINE

## 2025-03-05 PROCEDURE — 93005 ELECTROCARDIOGRAM TRACING: CPT | Performed by: INTERNAL MEDICINE

## 2025-03-05 NOTE — PROGRESS NOTES
this visit to record, process the conversation to generate a clinical note, and support improvement of the AI technology. The patient (or guardian, if applicable) and other individuals in attendance at the appointment consented to the use of AI, including the recording.

## 2025-03-12 ENCOUNTER — PATIENT MESSAGE (OUTPATIENT)
Dept: INTERNAL MEDICINE | Age: 65
End: 2025-03-12

## 2025-03-12 ENCOUNTER — TELEPHONE (OUTPATIENT)
Dept: CARDIOLOGY CLINIC | Age: 65
End: 2025-03-12

## 2025-03-12 NOTE — TELEPHONE ENCOUNTER
Patient Appointment Form:   scheduled from referral        PCP: Dr Martin  Referring: Dr Martin     Has the Patient:     Seen a Cardiologist? No       Had a heart catheterization? No      Had heart surgery? No     Had a stress test or nuclear stress test? No      Had an echocardiogram? No     Had a vascular ultrasound? No     Had a 24/48 heart monitor or extended cardiac event monitor? No     Had recent blood work in the last 6 months? No       Had a pacemaker/ICD/ILR implant? No     Seen an Electrophysiologist? No       Had a cardiac ablation?  No       Will send records via: in Epic        Date & time of appointment:  3/21/25 11:30am Dr Rasmussen

## 2025-03-13 ENCOUNTER — TELEPHONE (OUTPATIENT)
Dept: CARDIOLOGY | Age: 65
End: 2025-03-13

## 2025-03-14 ENCOUNTER — HOSPITAL ENCOUNTER (OUTPATIENT)
Age: 65
Discharge: HOME | End: 2025-03-14
Payer: MEDICAID

## 2025-03-14 ENCOUNTER — TELEPHONE (OUTPATIENT)
Dept: INTERNAL MEDICINE | Age: 65
End: 2025-03-14

## 2025-03-14 DIAGNOSIS — E78.2 MIXED HYPERLIPIDEMIA: ICD-10-CM

## 2025-03-14 DIAGNOSIS — R20.2 PARESTHESIAS: Primary | ICD-10-CM

## 2025-03-14 LAB
ALBUMIN SERPL-MCNC: 4.3 G/DL (ref 3.5–5.2)
ALP SERPL-CCNC: 102 U/L (ref 40–129)
ALT SERPL-CCNC: 23 U/L (ref 0–40)
ANION GAP SERPL CALCULATED.3IONS-SCNC: 16 MMOL/L (ref 7–16)
AST SERPL-CCNC: 18 U/L (ref 0–39)
BILIRUB SERPL-MCNC: 0.3 MG/DL (ref 0–1.2)
BUN SERPL-MCNC: 15 MG/DL (ref 6–23)
CALCIUM SERPL-MCNC: 9.5 MG/DL (ref 8.6–10.2)
CHLORIDE SERPL-SCNC: 101 MMOL/L (ref 98–107)
CHOLEST SERPL-MCNC: 176 MG/DL
CO2 SERPL-SCNC: 24 MMOL/L (ref 22–29)
CREAT SERPL-MCNC: 1 MG/DL (ref 0.7–1.2)
GFR, ESTIMATED: 85 ML/MIN/1.73M2
GLUCOSE SERPL-MCNC: 106 MG/DL (ref 74–99)
HDLC SERPL-MCNC: 58 MG/DL
LDLC SERPL CALC-MCNC: 94 MG/DL
POTASSIUM SERPL-SCNC: 4 MMOL/L (ref 3.5–5)
PROT SERPL-MCNC: 7.4 G/DL (ref 6.4–8.3)
SODIUM SERPL-SCNC: 141 MMOL/L (ref 132–146)
TRIGL SERPL-MCNC: 122 MG/DL
VLDLC SERPL CALC-MCNC: 24 MG/DL

## 2025-03-14 PROCEDURE — 82785 ASSAY OF IGE: CPT

## 2025-03-14 PROCEDURE — 80053 COMPREHEN METABOLIC PANEL: CPT

## 2025-03-14 PROCEDURE — 86003 ALLG SPEC IGE CRUDE XTRC EA: CPT

## 2025-03-14 PROCEDURE — 36415 COLL VENOUS BLD VENIPUNCTURE: CPT

## 2025-03-14 PROCEDURE — 80061 LIPID PANEL: CPT

## 2025-03-14 NOTE — TELEPHONE ENCOUNTER
's lab called stating order for A1C needs to be changed to current date they are unable to release order because it is dated for 5/2025.

## 2025-03-16 LAB
A ALTERNATA IGE QN: NORMAL KU/L (ref 0–0.34)
A FUMIGATUS IGE QN: NORMAL KU/L (ref 0–0.34)
ALLERGEN BIRCH IGE: NORMAL KU/L (ref 0–0.34)
BERMUDA GRASS IGE QN: NORMAL KU/L (ref 0–0.34)
BOXELDER IGE QN: NORMAL KU/L (ref 0–0.34)
C HERBARUM IGE QN: NORMAL KUL/L (ref 0–0.34)
CALIF WALNUT POLN IGE QN: NORMAL KU/L (ref 0–0.34)
CAT DANDER IGE QN: NORMAL KU/L (ref 0–0.34)
CMN PIGWEED IGE QN: NORMAL KU/L (ref 0–0.34)
COMMON RAGWEED IGE QN: NORMAL KU/L (ref 0–0.34)
COTTONWOOD IGE QN: NORMAL KU/L (ref 0–0.34)
D FARINAE IGE QN: NORMAL KU/L (ref 0–0.34)
D PTERONYSS IGE QN: NORMAL KU/L (ref 0–0.34)
DOG DANDER IGE QN: NORMAL KU/L (ref 0–0.34)
IGE SERPL-ACNC: 40 IU/ML (ref 0–100)
LONDON PLANE IGE QN: NORMAL KU/L (ref 0–0.34)
M RACEMOSUS IGE QN: NORMAL KU/L (ref 0–0.34)
MOUSE EPITH IGE QN: NORMAL KU/L (ref 0–0.34)
MT JUNIPER IGE QN: NORMAL KU/L (ref 0–0.34)
P NOTATUM IGE QN: NORMAL KU/L (ref 0–0.34)
PECAN/HICK TREE IGE QN: NORMAL KU/L (ref 0–0.34)
ROACH IGE QN: NORMAL KU/L (ref 0–0.34)
SALTWORT IGE QN: NORMAL KU/L (ref 0–0.34)
SHEEP SORREL IGE QN: NORMAL KU/L (ref 0–0.34)
TIMOTHY IGE QN: NORMAL KU/L (ref 0–0.34)
WHITE ASH IGE QN: NORMAL KU/L (ref 0–0.34)
WHITE ELM IGE QN: NORMAL KU/L (ref 0–0.34)
WHITE MULBERRY IGE QN: NORMAL KU/L (ref 0–0.34)
WHITE OAK IGE QN: NORMAL KU/L (ref 0–0.34)

## 2025-03-17 LAB
A ALTERNATA IGE QN: <0.1 KU/L (ref 0–0.34)
A FUMIGATUS IGE QN: <0.1 KU/L (ref 0–0.34)
ALLERGEN BIRCH IGE: <0.1 KU/L (ref 0–0.34)
BERMUDA GRASS IGE QN: <0.1 KU/L (ref 0–0.34)
BOXELDER IGE QN: <0.1 KU/L (ref 0–0.34)
C HERBARUM IGE QN: <0.1 KUL/L (ref 0–0.34)
CALIF WALNUT POLN IGE QN: <0.1 KU/L (ref 0–0.34)
CAT DANDER IGE QN: <0.1 KU/L (ref 0–0.34)
CMN PIGWEED IGE QN: <0.1 KU/L (ref 0–0.34)
COMMON RAGWEED IGE QN: <0.1 KU/L (ref 0–0.34)
COTTONWOOD IGE QN: <0.1 KU/L (ref 0–0.34)
D FARINAE IGE QN: <0.1 KU/L (ref 0–0.34)
D PTERONYSS IGE QN: <0.1 KU/L (ref 0–0.34)
DOG DANDER IGE QN: NORMAL KU/L (ref 0–0.34)
IGE SERPL-ACNC: 40 IU/ML (ref 0–100)
LONDON PLANE IGE QN: <0.1 KU/L (ref 0–0.34)
M RACEMOSUS IGE QN: <0.1 KU/L (ref 0–0.34)
MOUSE EPITH IGE QN: <0.1 KU/L (ref 0–0.34)
MT JUNIPER IGE QN: <0.1 KU/L (ref 0–0.34)
P NOTATUM IGE QN: <0.1 KU/L (ref 0–0.34)
PECAN/HICK TREE IGE QN: <0.1 KU/L (ref 0–0.34)
ROACH IGE QN: <0.1 KU/L (ref 0–0.34)
SALTWORT IGE QN: <0.1 KU/L (ref 0–0.34)
SHEEP SORREL IGE QN: NORMAL KU/L (ref 0–0.34)
TIMOTHY IGE QN: <0.1 KU/L (ref 0–0.34)
WHITE ASH IGE QN: <0.1 KU/L (ref 0–0.34)
WHITE ELM IGE QN: <0.1 KU/L (ref 0–0.34)
WHITE MULBERRY IGE QN: <0.1 KU/L (ref 0–0.34)
WHITE OAK IGE QN: <0.1 KU/L (ref 0–0.34)

## 2025-03-19 ENCOUNTER — TELEPHONE (OUTPATIENT)
Age: 65
End: 2025-03-19

## 2025-03-21 ENCOUNTER — TELEPHONE (OUTPATIENT)
Dept: INTERNAL MEDICINE | Age: 65
End: 2025-03-21

## 2025-03-21 ENCOUNTER — OFFICE VISIT (OUTPATIENT)
Dept: CARDIOLOGY CLINIC | Age: 65
End: 2025-03-21
Payer: MEDICAID

## 2025-03-21 VITALS
SYSTOLIC BLOOD PRESSURE: 114 MMHG | DIASTOLIC BLOOD PRESSURE: 82 MMHG | RESPIRATION RATE: 18 BRPM | WEIGHT: 162 LBS | HEART RATE: 74 BPM | HEIGHT: 69 IN | BODY MASS INDEX: 23.99 KG/M2

## 2025-03-21 DIAGNOSIS — R55 SYNCOPE, UNSPECIFIED SYNCOPE TYPE: Primary | ICD-10-CM

## 2025-03-21 PROCEDURE — 99204 OFFICE O/P NEW MOD 45 MIN: CPT | Performed by: INTERNAL MEDICINE

## 2025-03-21 PROCEDURE — 93000 ELECTROCARDIOGRAM COMPLETE: CPT | Performed by: INTERNAL MEDICINE

## 2025-03-21 PROCEDURE — 3079F DIAST BP 80-89 MM HG: CPT | Performed by: INTERNAL MEDICINE

## 2025-03-21 PROCEDURE — 3074F SYST BP LT 130 MM HG: CPT | Performed by: INTERNAL MEDICINE

## 2025-03-21 ASSESSMENT — ENCOUNTER SYMPTOMS
ABDOMINAL PAIN: 0
COUGH: 0
BLOOD IN STOOL: 0
CHEST TIGHTNESS: 0
NAUSEA: 0
BACK PAIN: 0
RHINORRHEA: 1
SHORTNESS OF BREATH: 0
VOMITING: 0

## 2025-03-21 NOTE — PROGRESS NOTES
14 Day Xt Monitor was placed per Dr Rasmussen.  Serial number [id number].  Instructions were given & patient verbalized understanding.   
  LUNGS: Clear to auscultation bilaterally, no wheezing.    ABDOMEN: Abdomen is soft and not distended. No tenderness.    EXTREMITIES: There is no pedal edema.   MUSCULOSKELETAL: No joint swelling. Normal range of motion    SKIN: Skin is moist. No ulcers or lesions.   NEUROLOGICAL:No evidence of obvious neurological deficits.    PSYCHOLOGICAL: Patient is in normal mood.          LABORATORY DATA:   Lab Results   Component Value Date/Time    WBC 8.1 10/09/2024 09:20 AM    HGB 17.6 10/09/2024 09:20 AM    HCT 53.7 10/09/2024 09:20 AM     10/09/2024 09:20 AM      Lab Results   Component Value Date/Time     03/14/2025 09:44 AM    K 4.0 03/14/2025 09:44 AM    CO2 24 03/14/2025 09:44 AM    BUN 15 03/14/2025 09:44 AM    CREATININE 1.0 03/14/2025 09:44 AM      No components found for: \"HGBA1C\"   Lab Results   Component Value Date/Time    TSH 1.84 04/22/2024 02:03 PM      No components found for: \"LDLCALC\"           ASSESSMENT & PLAN:     64 y.o. male referred for evaluation of dizziness and syncope. Past medical history of smoking, HTN, TBI      Syncope:  Unclear etiology.  Occurred after drinking 2 brandies in the bar.  No preceding symptoms.  He does have symptoms of presyncope after prolonged coughing spells.  His ECG showing possible LVH.  He was told when he was a teenager that he has an athlete's heart and he did not play sports then.  Obtain TTE  Obtain Zio patch XT for 14 days  Etiology could also be possibly due to vasovagal syncope in the setting of prolonged coughing episodes if the prior testing is negative.  Advised on smoking cessation      RTC in 6-8 weeks      Den Rasmussen MD  Interventional cardiology / structural heart disease    [facility]  [facility] [facility] [address] [phone]

## 2025-03-21 NOTE — TELEPHONE ENCOUNTER
Fax received from [facility]. Monitor is not done for 21 days. They do only 24 and 48 hour holters or 7-14 days event monitors. Please adjust and reorder. Leta Coello LPN

## 2025-03-24 ENCOUNTER — RESULTS FOLLOW-UP (OUTPATIENT)
Dept: INTERNAL MEDICINE | Age: 65
End: 2025-03-24

## 2025-03-24 NOTE — TELEPHONE ENCOUNTER
Patient seen by cardiology on 3/21/2025 and a cardiac event monitor was ordered by Dr. Rasmussen.  I will cancel my previous order.     Emani Martin MD  3/24/2025

## 2025-03-31 ENCOUNTER — TELEPHONE (OUTPATIENT)
Age: 65
End: 2025-03-31

## 2025-04-01 ENCOUNTER — HOSPITAL ENCOUNTER (OUTPATIENT)
Age: 65
Discharge: HOME | End: 2025-04-03
Attending: INTERNAL MEDICINE
Payer: MEDICAID

## 2025-04-01 DIAGNOSIS — R55 SYNCOPE, UNSPECIFIED SYNCOPE TYPE: ICD-10-CM

## 2025-04-01 PROCEDURE — 93306 TTE W/DOPPLER COMPLETE: CPT

## 2025-04-02 ENCOUNTER — RESULTS FOLLOW-UP (OUTPATIENT)
Dept: INTERNAL MEDICINE | Age: 65
End: 2025-04-02

## 2025-04-02 LAB
ECHO AO ASC DIAM: 3.1 CM
ECHO AO SINUS VALSALVA DIAM: 3.8 CM
ECHO AV AREA PEAK VELOCITY: 2.1 CM2
ECHO AV AREA VTI: 2.3 CM2
ECHO AV CUSP MM: 2.1 CM
ECHO AV MEAN GRADIENT: 5 MMHG
ECHO AV MEAN VELOCITY: 1 M/S
ECHO AV PEAK GRADIENT: 9 MMHG
ECHO AV PEAK VELOCITY: 1.5 M/S
ECHO AV VELOCITY RATIO: 0.6
ECHO AV VTI: 29.6 CM
ECHO EST RA PRESSURE: 3 MMHG
ECHO LA DIAMETER: 3 CM
ECHO LA VOL A-L A2C: 59 ML (ref 18–58)
ECHO LA VOL A-L A4C: 33 ML (ref 18–58)
ECHO LA VOL BP: 44 ML (ref 18–58)
ECHO LA VOL MOD A2C: 58 ML (ref 18–58)
ECHO LA VOL MOD A4C: 30 ML (ref 18–58)
ECHO LA VOLUME AREA LENGTH: 47 ML
ECHO LV EF PHYSICIAN: 47 %
ECHO LV FRACTIONAL SHORTENING: 26 % (ref 28–44)
ECHO LV INTERNAL DIMENSION DIASTOLIC: 4.7 CM (ref 4.2–5.9)
ECHO LV INTERNAL DIMENSION SYSTOLIC: 3.5 CM
ECHO LV IVSD: 0.9 CM (ref 0.6–1)
ECHO LV MASS 2D: 142.7 G (ref 88–224)
ECHO LV POSTERIOR WALL DIASTOLIC: 0.9 CM (ref 0.6–1)
ECHO LV RELATIVE WALL THICKNESS RATIO: 0.38
ECHO LVOT AREA: 3.5 CM2
ECHO LVOT AV VTI INDEX: 0.64
ECHO LVOT DIAM: 2.1 CM
ECHO LVOT MEAN GRADIENT: 2 MMHG
ECHO LVOT PEAK GRADIENT: 3 MMHG
ECHO LVOT PEAK VELOCITY: 0.9 M/S
ECHO LVOT SV: 65.8 ML
ECHO LVOT VTI: 19 CM
ECHO MV "A" WAVE DURATION: 105.6 MSEC
ECHO MV A VELOCITY: 0.62 M/S
ECHO MV AREA PHT: 4.2 CM2
ECHO MV AREA VTI: 3.5 CM2
ECHO MV E DECELERATION TIME (DT): 229.2 MS
ECHO MV E VELOCITY: 0.62 M/S
ECHO MV E/A RATIO: 1
ECHO MV LVOT VTI INDEX: 0.99
ECHO MV MAX VELOCITY: 0.7 M/S
ECHO MV MEAN GRADIENT: 1 MMHG
ECHO MV MEAN VELOCITY: 0.5 M/S
ECHO MV PEAK GRADIENT: 2 MMHG
ECHO MV PRESSURE HALF TIME (PHT): 52.6 MS
ECHO MV VTI: 18.8 CM
ECHO PULMONARY ARTERY END DIASTOLIC PRESSURE: 3 MMHG
ECHO PV MAX VELOCITY: 1.2 M/S
ECHO PV MEAN GRADIENT: 3 MMHG
ECHO PV MEAN VELOCITY: 0.7 M/S
ECHO PV PEAK GRADIENT: 6 MMHG
ECHO PV REGURGITANT MAX VELOCITY: 0.8 M/S
ECHO PV VTI: 20.3 CM
ECHO RIGHT VENTRICULAR SYSTOLIC PRESSURE (RVSP): 29 MMHG
ECHO RV INTERNAL DIMENSION: 2.8 CM
ECHO RV TAPSE: 2 CM (ref 1.7–?)
ECHO TV REGURGITANT MAX VELOCITY: 2.55 M/S
ECHO TV REGURGITANT PEAK GRADIENT: 26 MMHG

## 2025-04-04 NOTE — TELEPHONE ENCOUNTER
----- Message from Dr. Emani Martin MD sent at 4/2/2025 12:18 PM EDT -----  Please let Kamran know that I will be calling in regards to his echo after review with cardiology.     Emani Martin MD  4/2/2025

## 2025-04-04 NOTE — TELEPHONE ENCOUNTER
Emani Martin MD to Den Rasmussen MD       4/2/25 12:17 PM   I reviewed Kamran' echo.  With mild global hypokinesis and mild decrease in EF, do you want me to order anything in anticipation of his follow-up with you in May?  Stress test?  Or given syncope, does he need a catheterization? Very much appreciate your input.    Ngoc Martin MD  4/2/2025

## 2025-04-08 ENCOUNTER — TELEPHONE (OUTPATIENT)
Dept: INTERNAL MEDICINE | Age: 65
End: 2025-04-08

## 2025-04-08 NOTE — TELEPHONE ENCOUNTER
Pt requested appt with LSW to further discuss transition to Medicare; note pt has appt with Dr Martin on 4.21.25 and message left to come in 45 minutes prior to appt to meet with LSW and to retrun call to confirm if that would work

## 2025-04-28 RX ORDER — AMLODIPINE BESYLATE 5 MG/1
5 TABLET ORAL DAILY
Qty: 90 TABLET | Refills: 1 | Status: SHIPPED | OUTPATIENT
Start: 2025-04-28

## 2025-04-28 RX ORDER — NICOTINE 21 MG/24HR
1 PATCH, TRANSDERMAL 24 HOURS TRANSDERMAL DAILY
Qty: 42 PATCH | Refills: 0 | Status: SHIPPED | OUTPATIENT
Start: 2025-04-28 | End: 2025-06-09

## 2025-04-28 RX ORDER — ATORVASTATIN CALCIUM 40 MG/1
40 TABLET, FILM COATED ORAL DAILY
Qty: 90 TABLET | Refills: 1 | Status: SHIPPED | OUTPATIENT
Start: 2025-04-28

## 2025-04-28 RX ORDER — HYDROCHLOROTHIAZIDE 25 MG/1
25 TABLET ORAL EVERY MORNING
Qty: 90 TABLET | Refills: 1 | OUTPATIENT
Start: 2025-04-28

## 2025-04-28 RX ORDER — HYDROCHLOROTHIAZIDE 25 MG/1
25 TABLET ORAL EVERY MORNING
Qty: 90 TABLET | Refills: 1 | Status: SHIPPED | OUTPATIENT
Start: 2025-04-28

## 2025-04-28 RX ORDER — MONTELUKAST SODIUM 10 MG/1
10 TABLET ORAL DAILY
Qty: 90 TABLET | Refills: 1 | Status: SHIPPED | OUTPATIENT
Start: 2025-04-28

## 2025-05-05 DIAGNOSIS — R55 SYNCOPE, UNSPECIFIED SYNCOPE TYPE: ICD-10-CM

## 2025-05-30 ENCOUNTER — OFFICE VISIT (OUTPATIENT)
Dept: CARDIOLOGY CLINIC | Age: 65
End: 2025-05-30

## 2025-05-30 VITALS
DIASTOLIC BLOOD PRESSURE: 80 MMHG | HEIGHT: 69 IN | SYSTOLIC BLOOD PRESSURE: 120 MMHG | BODY MASS INDEX: 24.29 KG/M2 | RESPIRATION RATE: 18 BRPM | WEIGHT: 164 LBS | HEART RATE: 75 BPM

## 2025-05-30 DIAGNOSIS — R55 SYNCOPE, UNSPECIFIED SYNCOPE TYPE: Primary | ICD-10-CM

## 2025-05-30 RX ORDER — LISINOPRIL 5 MG/1
2.5 TABLET ORAL DAILY
Qty: 45 TABLET | Refills: 1 | Status: SHIPPED | OUTPATIENT
Start: 2025-05-30 | End: 2025-05-30

## 2025-05-30 RX ORDER — LISINOPRIL 5 MG/1
10 TABLET ORAL DAILY
Qty: 180 TABLET | Refills: 1 | Status: SHIPPED | OUTPATIENT
Start: 2025-05-30

## 2025-05-30 RX ORDER — METOPROLOL SUCCINATE 25 MG/1
25 TABLET, EXTENDED RELEASE ORAL DAILY
Qty: 30 TABLET | Refills: 3 | Status: SHIPPED | OUTPATIENT
Start: 2025-05-30

## 2025-05-30 ASSESSMENT — ENCOUNTER SYMPTOMS
ABDOMINAL PAIN: 0
BACK PAIN: 0
RHINORRHEA: 1
COUGH: 0
CHEST TIGHTNESS: 0
BLOOD IN STOOL: 0
NAUSEA: 0
SHORTNESS OF BREATH: 0
VOMITING: 0

## 2025-05-30 NOTE — PROGRESS NOTES
OFFICE VISIT    NAME: Kamran Dutta     YOB: 1960   AGE: 64 y.o.   MEDICAL RECORD NUMBER: [id number]       CONSULTATION INFORMATION:   DATE OF CLINIC CONSULTATION: 5/30/2025   PRINCIPLE DIAGNOSIS / reason for visit: Dizziness and syncope    CARE TEAM MEMBERS    PCP : Emani Martin MD     REFERRING PROVIDER: No ref. provider found     HISTORY OF PRESENT ILLNESS:   Kamran Dutta is a 64 y.o. male referred for evaluation of dizziness and syncope. 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  He was standing in the kitchen when he suddenly collapsed and fell on his face broke his nose.  He also reports symptoms of presyncope after prolonged coughing episode.  Denies chest pain, shortness of breath or LE edema.  He has been having chronic rhinorrhea in the ED reports that he went to ENT and no one seems to figure out why he is having it.  He reports that he had passing out spell 20 years ago as well.  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  He had a cardiac cath about 15 years ago and reportedly did not show blockages.    I ordered an echocardiogram which showed an EF of 45-50%.  I discussed with patient ischemic evaluation but he declines at this time.  Continues to deny chest pain, shortness of breath, dyspnea on exertion or LE edema but reports ongoing dizzy spells when he coughs and he has nasal drainage that did not improve.      PERTINENT RADIOGRAPHIC/DIAGNOSTICS:   -TTE 4/1/2025:    Left Ventricle: Mildly reduced left ventricular systolic function with a visually estimated EF of 45 - 50%. Left ventricle size is normal. Normal wall thickness. Mild global hypokinesis present. Grade I diastolic dysfunction with normal LAP.    Right Ventricle: Right

## 2025-07-01 ENCOUNTER — PATIENT MESSAGE (OUTPATIENT)
Dept: PRIMARY CARE CLINIC | Age: 65
End: 2025-07-01

## 2025-07-14 ENCOUNTER — TELEPHONE (OUTPATIENT)
Dept: CARDIOLOGY CLINIC | Age: 65
End: 2025-07-14

## 2025-07-14 NOTE — TELEPHONE ENCOUNTER
Dr. Mcbride,    Patient is Dr. Phipps's. He was put on Metoprolol Succinate as well as Lisinopril. Both medications was causing headaches and did not help with near fainting and coughs.     Patient started back taking his medication he was on before.   Hydrochlorothiazide  Amlodipine  Atorvastatin    No more headaches, and BP has been good.    Please Advise.

## 2025-07-15 ENCOUNTER — OFFICE VISIT (OUTPATIENT)
Dept: PRIMARY CARE CLINIC | Age: 65
End: 2025-07-15
Payer: MEDICAID

## 2025-07-15 ENCOUNTER — PATIENT MESSAGE (OUTPATIENT)
Dept: PRIMARY CARE CLINIC | Age: 65
End: 2025-07-15

## 2025-07-15 VITALS
OXYGEN SATURATION: 95 % | DIASTOLIC BLOOD PRESSURE: 77 MMHG | WEIGHT: 159 LBS | TEMPERATURE: 97.5 F | RESPIRATION RATE: 18 BRPM | HEART RATE: 77 BPM | SYSTOLIC BLOOD PRESSURE: 113 MMHG | HEIGHT: 69 IN | BODY MASS INDEX: 23.55 KG/M2

## 2025-07-15 DIAGNOSIS — I10 PRIMARY HYPERTENSION: ICD-10-CM

## 2025-07-15 DIAGNOSIS — E78.2 MIXED HYPERLIPIDEMIA: ICD-10-CM

## 2025-07-15 DIAGNOSIS — J31.0 CHRONIC RHINITIS: Primary | ICD-10-CM

## 2025-07-15 DIAGNOSIS — G89.29 CHRONIC RIGHT SHOULDER PAIN: ICD-10-CM

## 2025-07-15 DIAGNOSIS — Z59.71 INSURANCE COVERAGE PROBLEMS: ICD-10-CM

## 2025-07-15 DIAGNOSIS — M25.511 CHRONIC RIGHT SHOULDER PAIN: ICD-10-CM

## 2025-07-15 DIAGNOSIS — R20.2 PARESTHESIAS: ICD-10-CM

## 2025-07-15 PROCEDURE — 3078F DIAST BP <80 MM HG: CPT | Performed by: INTERNAL MEDICINE

## 2025-07-15 PROCEDURE — 3074F SYST BP LT 130 MM HG: CPT | Performed by: INTERNAL MEDICINE

## 2025-07-15 PROCEDURE — 83036 HEMOGLOBIN GLYCOSYLATED A1C: CPT | Performed by: INTERNAL MEDICINE

## 2025-07-15 PROCEDURE — 99214 OFFICE O/P EST MOD 30 MIN: CPT | Performed by: INTERNAL MEDICINE

## 2025-07-15 RX ORDER — ATORVASTATIN CALCIUM 40 MG/1
40 TABLET, FILM COATED ORAL DAILY
Qty: 90 TABLET | Refills: 1 | Status: SHIPPED | OUTPATIENT
Start: 2025-07-15

## 2025-07-15 RX ORDER — AMLODIPINE BESYLATE 5 MG/1
5 TABLET ORAL DAILY
COMMUNITY
End: 2025-07-15 | Stop reason: SDUPTHER

## 2025-07-15 RX ORDER — MONTELUKAST SODIUM 10 MG/1
10 TABLET ORAL DAILY
Qty: 30 TABLET | Refills: 3 | Status: SHIPPED | OUTPATIENT
Start: 2025-07-15

## 2025-07-15 RX ORDER — FEXOFENADINE HCL 180 MG/1
180 TABLET ORAL DAILY
Qty: 30 TABLET | Refills: 1 | Status: SHIPPED | OUTPATIENT
Start: 2025-07-15 | End: 2025-09-13

## 2025-07-15 RX ORDER — HYDROCHLOROTHIAZIDE 25 MG/1
25 TABLET ORAL DAILY
Qty: 90 TABLET | Refills: 1 | Status: SHIPPED | OUTPATIENT
Start: 2025-07-15

## 2025-07-15 RX ORDER — AMLODIPINE BESYLATE 5 MG/1
5 TABLET ORAL DAILY
Qty: 90 TABLET | Refills: 1 | Status: SHIPPED | OUTPATIENT
Start: 2025-07-15

## 2025-07-15 RX ORDER — HYDROCHLOROTHIAZIDE 25 MG/1
25 TABLET ORAL DAILY
COMMUNITY
End: 2025-07-15 | Stop reason: SDUPTHER

## 2025-07-15 NOTE — PROGRESS NOTES
[facility] - [facility]      SUBJECTIVE:  Kamran Dutta (:  1960) is a 64 y.o. male here for evaluation of the following chief complaint(s):  Hypertension and Hyperlipidemia    Assessment & Plan  1. Blood pressure management with vasovagal syncope.  Intolerant of B-blocker.   - Blood pressure readings are within the normal range today.  - Experienced mild migraines with metoprolol and has since returned to original medication.  - Prescriptions for amlodipine and hydrochlorothiazide have been renewed.    2. Shoulder pain.  - Reports recurrent shoulder pain from a previous work injury, limiting ability to lift objects.  - No previous physical therapy for shoulder.  - Referral for physical therapy has been made to assess and potentially improve range of motion.  - Declined further diagnostic tests like x-ray.    3. Nasal drainage.  - Continues to experience nasal drainage despite previous use of Claritin.  - Prescriptions for fexofenadine (Allegra) and montelukast (Singulair) provided, to be taken once daily in the morning.  - 30-day supply with a refill given; patient to inform if effective for further refills.  - Fluid behind R eardrum observed, indicating congestion.    4. Cholesterol management.  - Cholesterol levels checked in 2025 are well-controlled with Lipitor.  - Lipitor prescription renewed.  - Discussed potential muscle pain side effect; CK lab test offered but declined.    5. Foot numbness.  - Reports persistent cold and numb feet, unable to bend toes.  - Hemoglobin A1c test to be conducted today via fingerstick to rule out diabetes.  - No worsening of symptoms reported.    6. Tinnitus.  - Continues to experience ringing in ears at night.  - Fluid behind eardrum observed, indicating congestion.    7. Smoking cessation.  - Still using nicotine patch to quit smoking.  - Discussed potential impact of smoking on nasal drainage.    8. Social issues.  - Referral to

## 2025-07-17 ENCOUNTER — TELEPHONE (OUTPATIENT)
Age: 65
End: 2025-07-17

## 2025-07-17 ENCOUNTER — PATIENT MESSAGE (OUTPATIENT)
Dept: PRIMARY CARE CLINIC | Age: 65
End: 2025-07-17

## 2025-07-17 DIAGNOSIS — G89.29 CHRONIC RIGHT SHOULDER PAIN: Primary | ICD-10-CM

## 2025-07-17 DIAGNOSIS — M25.511 CHRONIC RIGHT SHOULDER PAIN: Primary | ICD-10-CM

## 2025-07-17 NOTE — TELEPHONE ENCOUNTER
Consult from Dr Martin to assist with location of vision doctor in network with present insurance as [employer] [facility] and for assistance with transition to Medicare before September.       Message left to call LSW direct number at [phone]    Reviewed provider directory for [facility] [employer] vision services within closest distance to pt and confirmed with [facility] they accept pt's insurance and could have appt in 2 weeks; contact with [employer] and 1st appt available in November for exam

## 2025-07-21 ENCOUNTER — HOSPITAL ENCOUNTER (OUTPATIENT)
Dept: PHYSICAL THERAPY | Age: 65
Setting detail: THERAPIES SERIES
Discharge: HOME | End: 2025-07-21
Payer: MEDICAID

## 2025-07-21 PROCEDURE — 97161 PT EVAL LOW COMPLEX 20 MIN: CPT | Performed by: PHYSICAL THERAPIST

## 2025-07-21 ASSESSMENT — PAIN DESCRIPTION - PAIN TYPE: TYPE: CHRONIC PAIN

## 2025-07-21 ASSESSMENT — PAIN DESCRIPTION - DESCRIPTORS: DESCRIPTORS: ACHING;BURNING

## 2025-07-21 ASSESSMENT — PAIN DESCRIPTION - ORIENTATION: ORIENTATION: RIGHT

## 2025-07-21 ASSESSMENT — PAIN SCALES - GENERAL: PAINLEVEL_OUTOF10: 4

## 2025-07-21 ASSESSMENT — PAIN DESCRIPTION - LOCATION: LOCATION: SHOULDER

## 2025-07-21 NOTE — PROGRESS NOTES
Physical Therapy: Initial Evaluation    Patient: Kamran Dutta (64 y.o. male)   Examination Date: 2025  Plan of Care Certification Period: 2025 to        :  1960 ;    Confirmed: Yes MRN: [id number]  CSN: [id number]   Insurance: Payor: [employer] [place] / Plan: [employer] [place] / Product Type: *No Product type* /   Insurance ID: [id number] - (Medicaid Managed) Secondary Insurance (if applicable):    Referring Physician: Emani Martin MD     PCP: Emani Martin MD Visits to Date/Visits Approved:     No Show/Cancelled Appts:   /       Medical Diagnosis: Pain in right shoulder [M25.511]  Other chronic pain [G89.29]    Treatment Diagnosis:       PERTINENT MEDICAL HISTORY           Medical History: Chart Reviewed: Yes   Past Medical History:   Diagnosis Date    Aneurysm     Hypertension     Traumatic brain injury (HCC)          Surgical History: No past surgical history on file.    Medications:   Current Outpatient Medications:     atorvastatin (LIPITOR) 40 MG tablet, Take 1 tablet by mouth daily, Disp: 90 tablet, Rfl: 1    amLODIPine (NORVASC) 5 MG tablet, Take 1 tablet by mouth daily, Disp: 90 tablet, Rfl: 1    hydroCHLOROthiazide (HYDRODIURIL) 25 MG tablet, Take 1 tablet by mouth daily, Disp: 90 tablet, Rfl: 1    montelukast (SINGULAIR) 10 MG tablet, Take 1 tablet by mouth daily, Disp: 30 tablet, Rfl: 3    fexofenadine (ALLEGRA) 180 MG tablet, Take 1 tablet by mouth daily, Disp: 30 tablet, Rfl: 1    metoprolol succinate (TOPROL XL) 25 MG extended release tablet, Take 1 tablet by mouth daily, Disp: 30 tablet, Rfl: 3    lisinopril (PRINIVIL;ZESTRIL) 5 MG tablet, Take 2 tablets by mouth daily, Disp: 180 tablet, Rfl: 1    nicotine (NICODERM CQ) 21 MG/24HR, Place 1 patch onto the skin daily, Disp: 42 patch, Rfl: 0  Allergies: Sulfa antibiotics      SUBJECTIVE EXAMINATION      ,           Subjective History: Onset Date: 07/15/25  Subjective: pt presents to therapy with c/o R shoulder pain

## 2025-07-21 NOTE — PROGRESS NOTES
[facility]                Phone: [phone]   Fax: [phone]    Physical Therapy Daily Treatment Note  Date:  2025    Patient Name:  Kamran Dutta    :  1960  MRN: [id number]    Evaluating therapist:  JEVON Pinzon   (25)  Restrictions/Precautions:    Diagnosis:  chronic R shoulder pain   Treatment Diagnosis:    Insurance/Certification information:  [employer]  Referring Physician:  LASHAY Martin  Plan of care signed (Y/N):    Visit# / total visits:  -  Pain level: 4/10   Time In:  Time Out:    Subjective:      Exercises:  Exercise/Equipment Resistance/Repetitions Other comments   UBE             pulleys for flex/abd            pendulums            table st:  flex                    abd                    ER              shrugs     scap ret     supine wand flex                                                  Other Therapeutic Activities:      Home Exercise Program:  provided 25    Manual Treatments:      Modalities:  IFC/MH to R shoulder    Timed Code Treatment Minutes:      Total Treatment Minutes:      Treatment/Activity Tolerance:  [] Patient tolerated treatment well [] Patient limited by fatique  [] Patient limited by pain  [] Patient limited by other medical complications  [] Other:     Prognosis: [] Good [] Fair  [] Poor    Patient Requires Follow-up: [] Yes  [] No    Plan:   [] Continue per plan of care [] Alter current plan (see comments)  [] Plan of care initiated [] Hold pending MD visit [] Discharge  Plan for Next Session:      See Weekly Progress Note: []  Yes  []  No  Next due:        Electronically signed by:  Lee Betts PT

## 2025-07-23 ENCOUNTER — HOSPITAL ENCOUNTER (OUTPATIENT)
Dept: PHYSICAL THERAPY | Age: 65
Setting detail: THERAPIES SERIES
End: 2025-07-23
Payer: MEDICAID

## 2025-07-25 ENCOUNTER — HOSPITAL ENCOUNTER (OUTPATIENT)
Dept: PHYSICAL THERAPY | Age: 65
Setting detail: THERAPIES SERIES
Discharge: HOME | End: 2025-07-25
Payer: MEDICAID

## 2025-07-25 PROCEDURE — G0283 ELEC STIM OTHER THAN WOUND: HCPCS

## 2025-07-25 PROCEDURE — 97110 THERAPEUTIC EXERCISES: CPT

## 2025-07-25 NOTE — PROGRESS NOTES
[facility]                Phone: [phone]   Fax: [phone]    Physical Therapy Daily Treatment Note      Date:  2025    Patient Name:  Kamran Dutta   :  1960 MRN: [id number]    Evaluating therapist:  JEVON Pinzon   25  Referring Physician:  LASHAY Martin  Diagnosis:  chronic R shoulder pain   Restrictions/Precautions:    Insurance:  [employer]  Plan of care signed (Y/N):    Visit# / total visits:    /-8  Pain level: 0-8/10       Time In:     1038  Time Out:    1159    Subjective:   Patient presents for first scheduled treatment session following initial evaluation.  He reports no pain in R shoulder prior to session at rest with pain  ~3/10 with daily use and as much as 8/10 with extended use.        Exercises:  Exercise/Equipment Resistance/Repetitions Comments   UBE f/b 3 min ea            Pulleys for flex/abd 5 min ea         Shrugs 20x    Scap ret 20x         Supine wand flex 2 x10           Table slides  flex 10x                           abd 10x                           ER 10x             Pendulums   20x ea 2#                    Objective:   Ther. exercise as listed per flow sheet above.     Treatment completed with MH/IFC to R shoulder x 15 minutes.      Assessment:   Patient performs exercises with good effort and pacing.   HEP reviewed with patient demonstrating good ability and voicing good understanding of all exercises/stretches.      Evaluation Findings:       c/o R shoulder pain for several yrs. telling PT it is due to work injury   c/o slight N/T at times into R forearm as well as occassional clicking in the joint with movement at/above 90°  c/o pain 4/10 across R shoulder  Posture: slightly forward head/rounded shoulders/B protracted scapulae   Palpation: discomfort noted across ant/lateral poles of R shoulder into upper trap and RTC insertion   AROM Cervical:  limited ~ 75% in flex and 50% WNL for all other motions   AROM R shoulder:

## 2025-07-28 ENCOUNTER — TELEPHONE (OUTPATIENT)
Age: 65
End: 2025-07-28

## 2025-07-28 NOTE — TELEPHONE ENCOUNTER
Return call from pt to schedule appt for Medicare transition in Sept 2025; LSW provided pt with list of documents to bring including correspondence from Medicare and social security POI; pt to gather and call LSW for appt this week or next depending on status of locating

## 2025-07-29 ENCOUNTER — HOSPITAL ENCOUNTER (OUTPATIENT)
Dept: PHYSICAL THERAPY | Age: 65
Setting detail: THERAPIES SERIES
Discharge: HOME | End: 2025-07-29
Payer: MEDICAID

## 2025-07-29 PROCEDURE — G0283 ELEC STIM OTHER THAN WOUND: HCPCS

## 2025-07-29 PROCEDURE — 97110 THERAPEUTIC EXERCISES: CPT

## 2025-07-31 ENCOUNTER — HOSPITAL ENCOUNTER (OUTPATIENT)
Dept: PHYSICAL THERAPY | Age: 65
Setting detail: THERAPIES SERIES
Discharge: HOME | End: 2025-07-31
Payer: MEDICAID

## 2025-07-31 PROCEDURE — G0283 ELEC STIM OTHER THAN WOUND: HCPCS

## 2025-07-31 PROCEDURE — 97110 THERAPEUTIC EXERCISES: CPT

## 2025-07-31 NOTE — PROGRESS NOTES
[facility]                Phone: [phone]   Fax: [phone]    Physical Therapy Daily Treatment Note      Date:  2025    Patient Name:  Kamran Dutta   :  1960 MRN: [id number]    Evaluating therapist:  JEVON Pinzon   25  Referring Physician:  LASHAY Martin  Diagnosis:  Chronic R shoulder pain   Restrictions/Precautions:    Insurance:  [employer]  Plan of care signed (Y/N):    Visit# / total visits:       Pain level:  2/10       Time In:      0754  Time Out:   09    Subjective:   Patient presents for second of two scheduled treatment sessions this week.   He reports pain in R shoulder 2/10 this morning.      He reports he will be out of town next week.  He is scheduled for the following week.      Exercises:  Exercise/Equipment Resistance/Repetitions Comments   UBE f/b 3 min ea          Doorway str  single arm 5 x20s            Pulleys for flex/abd 5 min ea         High ext 2 x15  btb    Mid row 2 x15  ptb    Horz ABd w. ER 2 x10  otb         Supine wand flex 3 x10         PROM supine ER 10x  5s         Shoulder ext str  5 x20s     Biceps str 5 x20s            Table slides  flex 10x  5s                         abd 10x  5s                         ER 10x  5s           Pendulums   HEP                    Objective:   Ther. exercise as listed per flow sheet above.     Treatment completed with MH/IFC to R shoulder x 15 minutes.     Assessment:   Patient performs exercises with good effort and pacing.    Evaluation Findings:       c/o R shoulder pain for several yrs. telling PT it is due to work injury   c/o slight N/T at times into R forearm as well as occassional clicking in the joint with movement at/above 90°  c/o pain 4/10 across R shoulder  Posture: slightly forward head/rounded shoulders/B protracted scapulae   Palpation: discomfort noted across ant/lateral poles of R shoulder into upper trap and RTC insertion   AROM Cervical:  limited ~ 75% in flex and

## 2025-08-01 ENCOUNTER — TELEPHONE (OUTPATIENT)
Age: 65
End: 2025-08-01

## 2025-08-01 NOTE — TELEPHONE ENCOUNTER
Pt contacted LSW and has gathered documents ; appt scheduled for 8.4.25 to meet with LSW to assist with transition to Medicare

## 2025-08-05 ENCOUNTER — APPOINTMENT (OUTPATIENT)
Dept: PHYSICAL THERAPY | Age: 65
End: 2025-08-05
Payer: MEDICAID

## 2025-08-05 ENCOUNTER — PATIENT MESSAGE (OUTPATIENT)
Dept: PRIMARY CARE CLINIC | Age: 65
End: 2025-08-05

## 2025-08-05 ENCOUNTER — TELEPHONE (OUTPATIENT)
Age: 65
End: 2025-08-05

## 2025-08-05 DIAGNOSIS — I10 PRIMARY HYPERTENSION: ICD-10-CM

## 2025-08-07 ENCOUNTER — APPOINTMENT (OUTPATIENT)
Dept: PHYSICAL THERAPY | Age: 65
End: 2025-08-07
Payer: MEDICAID

## 2025-08-07 RX ORDER — ATORVASTATIN CALCIUM 40 MG/1
40 TABLET, FILM COATED ORAL DAILY
Qty: 90 TABLET | Refills: 1 | Status: SHIPPED | OUTPATIENT
Start: 2025-08-07

## 2025-08-07 RX ORDER — AMLODIPINE BESYLATE 5 MG/1
5 TABLET ORAL DAILY
Qty: 90 TABLET | Refills: 1 | Status: SHIPPED | OUTPATIENT
Start: 2025-08-07

## 2025-08-07 RX ORDER — HYDROCHLOROTHIAZIDE 25 MG/1
25 TABLET ORAL DAILY
Qty: 90 TABLET | Refills: 1 | Status: SHIPPED | OUTPATIENT
Start: 2025-08-07

## 2025-08-12 ENCOUNTER — HOSPITAL ENCOUNTER (OUTPATIENT)
Dept: PHYSICAL THERAPY | Age: 65
Setting detail: THERAPIES SERIES
Discharge: HOME | End: 2025-08-12
Payer: MEDICAID

## 2025-08-12 PROCEDURE — 97110 THERAPEUTIC EXERCISES: CPT

## 2025-08-12 PROCEDURE — G0283 ELEC STIM OTHER THAN WOUND: HCPCS

## 2025-08-14 ENCOUNTER — HOSPITAL ENCOUNTER (OUTPATIENT)
Dept: PHYSICAL THERAPY | Age: 65
Setting detail: THERAPIES SERIES
Discharge: HOME | End: 2025-08-14
Payer: MEDICAID

## 2025-08-14 PROCEDURE — 97110 THERAPEUTIC EXERCISES: CPT

## 2025-08-14 PROCEDURE — G0283 ELEC STIM OTHER THAN WOUND: HCPCS

## 2025-08-26 ENCOUNTER — HOSPITAL ENCOUNTER (OUTPATIENT)
Dept: PHYSICAL THERAPY | Age: 65
Setting detail: THERAPIES SERIES
Discharge: HOME | End: 2025-08-26
Payer: MEDICAID

## 2025-08-26 PROCEDURE — G0283 ELEC STIM OTHER THAN WOUND: HCPCS

## 2025-08-26 PROCEDURE — 97110 THERAPEUTIC EXERCISES: CPT

## 2025-08-28 ENCOUNTER — HOSPITAL ENCOUNTER (OUTPATIENT)
Dept: PHYSICAL THERAPY | Age: 65
Setting detail: THERAPIES SERIES
Discharge: HOME | End: 2025-08-28
Payer: MEDICAID